# Patient Record
Sex: FEMALE | Race: WHITE | NOT HISPANIC OR LATINO | Employment: OTHER | URBAN - METROPOLITAN AREA
[De-identification: names, ages, dates, MRNs, and addresses within clinical notes are randomized per-mention and may not be internally consistent; named-entity substitution may affect disease eponyms.]

---

## 2017-01-05 ENCOUNTER — TRANSCRIBE ORDERS (OUTPATIENT)
Dept: ADMINISTRATIVE | Facility: HOSPITAL | Age: 22
End: 2017-01-05

## 2017-01-05 ENCOUNTER — ALLSCRIPTS OFFICE VISIT (OUTPATIENT)
Dept: OTHER | Facility: OTHER | Age: 22
End: 2017-01-05

## 2017-01-05 DIAGNOSIS — R00.0 TACHYCARDIA: ICD-10-CM

## 2017-01-05 DIAGNOSIS — R00.0 TACHYCARDIA, UNSPECIFIED: Primary | ICD-10-CM

## 2017-01-09 ENCOUNTER — HOSPITAL ENCOUNTER (OUTPATIENT)
Dept: NON INVASIVE DIAGNOSTICS | Facility: CLINIC | Age: 22
Discharge: HOME | End: 2017-01-09
Payer: COMMERCIAL

## 2017-01-09 DIAGNOSIS — R00.0 TACHYCARDIA, UNSPECIFIED: ICD-10-CM

## 2017-01-09 PROCEDURE — 93306 TTE W/DOPPLER COMPLETE: CPT

## 2017-01-10 ENCOUNTER — GENERIC CONVERSION - ENCOUNTER (OUTPATIENT)
Dept: OTHER | Facility: OTHER | Age: 22
End: 2017-01-10

## 2017-02-07 ENCOUNTER — ALLSCRIPTS OFFICE VISIT (OUTPATIENT)
Dept: OTHER | Facility: OTHER | Age: 22
End: 2017-02-07

## 2017-05-19 ENCOUNTER — GENERIC CONVERSION - ENCOUNTER (OUTPATIENT)
Dept: OTHER | Facility: OTHER | Age: 22
End: 2017-05-19

## 2017-06-01 ENCOUNTER — GENERIC CONVERSION - ENCOUNTER (OUTPATIENT)
Dept: OTHER | Facility: OTHER | Age: 22
End: 2017-06-01

## 2017-07-03 ENCOUNTER — ALLSCRIPTS OFFICE VISIT (OUTPATIENT)
Dept: OTHER | Facility: OTHER | Age: 22
End: 2017-07-03

## 2018-01-12 VITALS
HEIGHT: 63 IN | HEART RATE: 91 BPM | BODY MASS INDEX: 31.18 KG/M2 | WEIGHT: 176 LBS | SYSTOLIC BLOOD PRESSURE: 128 MMHG | DIASTOLIC BLOOD PRESSURE: 76 MMHG | OXYGEN SATURATION: 97 %

## 2018-01-14 VITALS
HEIGHT: 63 IN | HEART RATE: 74 BPM | DIASTOLIC BLOOD PRESSURE: 78 MMHG | OXYGEN SATURATION: 99 % | WEIGHT: 178 LBS | BODY MASS INDEX: 31.54 KG/M2 | SYSTOLIC BLOOD PRESSURE: 112 MMHG

## 2018-01-14 VITALS
BODY MASS INDEX: 31.01 KG/M2 | DIASTOLIC BLOOD PRESSURE: 84 MMHG | OXYGEN SATURATION: 98 % | WEIGHT: 175 LBS | HEIGHT: 63 IN | HEART RATE: 80 BPM | SYSTOLIC BLOOD PRESSURE: 132 MMHG

## 2018-01-15 ENCOUNTER — ALLSCRIPTS OFFICE VISIT (OUTPATIENT)
Dept: OTHER | Facility: OTHER | Age: 23
End: 2018-01-15

## 2018-01-15 NOTE — RESULT NOTES
Verified Results  ECHO COMPLETE WITH CONTRAST IF INDICATED [id number] 04:00PM Shelly Augustin     Test Name Result Flag Reference   ECHO COMPLETE WITH CONTRAST IF INDICATED (Report)     [facility], [address] [place] [address]   [phone]     Transthoracic Echocardiogram   2D, M-mode, Doppler, and Color Doppler     Study date: 2017     Patient: Kristin Boswell   MR number: [id number]   Account number: [de-identified]   : 1995   Age: 23 years   Gender: Female   Status: Outpatient   Location: [facility]   Height: 63 in   Weight: 176 lb   BP: 128/ 76 mmHg     Indications: Tachycardia  Diagnoses: R00 0 - Tachycardia, unspecified     Sonographer: Helm RCS   Primary Physician: Isaura Carrasco   Referring Physician: Jaleesa Guillaume MD   Group: [facility]   Interpreting Physician: Jaleesa Guillaume MD     SUMMARY     LEFT VENTRICLE:   Ejection fraction was estimated to be 60 %  Although no diagnostic regional wall motion abnormality was identified, this   possibility cannot be completely excluded on the basis of this study  SUMMARY MEASUREMENTS   Unspecified Scan Mode measurements:   Aortic Valve:  HR was 55 {H  B }/min  Left Ventricle:  HR was 57 {H  B }/min  HISTORY: PRIOR HISTORY: No Cardiac History   POTS syndrome     PROCEDURE: The study was performed in the [address] [place] [address]  This   was a routine study  The transthoracic approach was used  The study included   complete 2D imaging, M-mode, complete spectral Doppler, and color Doppler  Image quality was adequate  LEFT VENTRICLE: Size was normal  Ejection fraction was estimated to be 60 %  Although no diagnostic regional wall motion abnormality was identified, this   possibility cannot be completely excluded on the basis of this study   DOPPLER:   Left ventricular diastolic function parameters were normal      RIGHT VENTRICLE: The size was normal  Systolic function was normal  Wall   thickness was normal      LEFT ATRIUM: Size was normal      RIGHT ATRIUM: Size was normal      MITRAL VALVE: Valve structure was normal  There was normal leaflet separation  DOPPLER: The transmitral velocity was within the normal range  There was no   evidence for stenosis  There was no regurgitation  AORTIC VALVE: The valve was not well visualized  DOPPLER: There was no evidence   for stenosis  TRICUSPID VALVE: The valve structure was normal  There was normal leaflet   separation  DOPPLER: The transtricuspid velocity was within the normal range  There was no evidence for stenosis  There was no regurgitation  PULMONIC VALVE: Leaflets exhibited normal thickness, no calcification, and   normal cuspal separation  DOPPLER: The transpulmonic velocity was within the   normal range  There was no regurgitation  PERICARDIUM: There was no pericardial effusion  The pericardium was normal in   appearance  AORTA: The root exhibited normal size  SYSTEM MEASUREMENT TABLES     Apical four chamber   4 chamber Left Atrium Volume Index; Planimetry; End Systole; Apical four   chamber;: 12 21 cm2 Left Ventricular End Diastolic Volume; Method of Disks,   Single Plane; Apical four chamber;: 84 3 ml Left Ventricular End Systolic   Volume; Method of Disks, Single Plane; Apical four chamber;: 28 1 ml Right   Atrium Systolic Area; Planimetry; End Systole; Apical four chamber;: 12 55 cm2   Right Ventricular Internal Diastolic Dimension; End Diastole; Apical four   chamber;: 37 1 mm   TAPSE: 19 8 mm     Unspecified Scan Mode   Aortic Root Diameter; End Systole;: 22 5 mm   HR: 55 {H  B }/min   Left atrial diameter; End Diastole;: 32 6 mm   Cardiac Output; Teichholz; Systole;: 2 92 L/min   HR: 57 {H  B }/min   Heart rate; Teichholz;: 62 {H  B }/min   Interventricular Septum Diastolic Thickness;  Teichholz; End Diastole;: 6 9 mm   Left Ventricle Internal End Diastolic Dimension; Teichholz;: 41 9 mm   Left Ventricle Internal Systolic Dimension; Teichholz; End Systole;: 27 3 mm   Left Ventricle Mass; Mass AVCube with Teichholz; End Diastole;: 79 g   Left Ventricle Posterior Wall Diastolic Thickness; Teichholz; End Diastole;:   6 4 mm   Left Ventricular Ejection Fraction; Teichholz;: 64 4 %   Left Ventricular End Diastolic Volume; Teichholz;: 78 1 ml   Left Ventricular End Systolic Volume; Teichholz;: 27 8 ml   Left Ventricular Fractional Shortening;: 34 8 %   Stroke volume;  Teichholz; Systole;: 50 3 ml   Mitral Valve Area; Area by Pressure Half-Time; Systole;: 3 01 cm2   Mitral Valve E to A Ratio; Systole;: 3 5     [facility] Accredited Echocardiography Laboratory     Prepared and electronically signed by     David Griffin MD   Signed [id number] 36:54:07

## 2018-01-16 NOTE — PROGRESS NOTES
Assessment   Assessed    1  POTS (postural orthostatic tachycardia syndrome) (427 89) (R00 0,I95 1)   2  Postural dizziness with presyncope (780 4,780 2) (R42,R55)   3  Dizziness (780 4) (R42)    Plan   POTS (postural orthostatic tachycardia syndrome)    · Fludrocortisone Acetate 0 1 MG Oral Tablet; 1 tab bid   Rx By: Bessie Alvarenga; Dispense: 0 Days ; #:60 Tablet; Refill: 5;For: POTS (postural orthostatic tachycardia syndrome); KEVIN = N; Verified Transmission to [employer] 209/115; Last Updated By: [employer]; 1/15/2018 11:03:43 AM   · Metoprolol Tartrate 25 MG Oral Tablet; take 1 tablet by mouth twice a day   Rx By: Bessie Alvarenga; Dispense: 30 Days ; #:60 Tablet; Refill: 6;For: POTS (postural orthostatic tachycardia syndrome); KEVIN = N; Verified Transmission to [facility]-/115   · Follow-up visit in 3 months Evaluation and Treatment  Follow-up  Status: Hold For -    Scheduling  Requested for: [id number]   Ordered; For: POTS (postural orthostatic tachycardia syndrome); Ordered By: Bessie Alvarenga Performed:  Due: 27NSL3690; Last Updated By: Amanda Mills; 1/15/2018 11:03:29 AM   · Follow-up visit in 3 months Evaluation and Treatment  Follow-up  Status: Hold For -    Scheduling  Requested for: [id number]   Ordered; For: POTS (postural orthostatic tachycardia syndrome); Ordered By: Bessie Alvarenga Performed:  Due: [id number]; Last Updated By: Amanda Mills; 1/15/2018 11:27:09 AM    Discussion/Summary   Cardiology Discussion Summary Free Text Note Form [facility]:    Given symptoms will increase Fludocortisone to BID symptoms to stay well hydrated that she can add a little salt to her food with pcp all meds  Report any symptoms  All questions answered  Previous studies reviewed with patient, medications reviewed and possible side effects discussed  Continue risk factor modification  All questions answered  Safety measures reviewed  Patient advised to report any problems prompting medical attention  Return for follow up visit in 3 months or earlier if needed      Goals and Barriers: The patient has the current Goals: Stay hydrated  The patent has the current Barriers: None  Patient's Capacity to Self-Care: Patient is able to Self-Care  Patient Education: Educational resources provided: See [facility]     Medication SE Review and Pt Understands Tx: Possible side effects of new medications were reviewed with the patient/guardian today  The treatment plan was reviewed with the patient/guardian  The patient/guardian understands and agrees with the treatment plan    Counseling Documentation With Imm: The patient was counseled regarding diagnostic results,-- instructions for management,-- risk factor reductions,-- prognosis,-- patient and family education,-- risks and benefits of treatment options,-- importance of compliance with treatment  Chief Complaint   Chief Complaint Free Text Note Form: followup visit    Chief Complaint Chronic Condition [facility]: Patient is here today for follow up of chronic conditions described in HPI  History of Present Illness   Cardiology HPI Free Text Note Form [facility]: Patient presents for follow-up visit with history of POTS, postural dizziness with presyncope, dizziness  Patient states she has been having on a weekly basis dizziness with vomiting  She admits that she has not been very diligent with her fluid intake  She denies any chest pain, chest pressure, chest heaviness  She denies any shortness of breath, palpitations  She is taking all medications as prescribed and does not miss any doses  Review of Systems   Cardiology Female ROS:         Cardiac: experiencing fainting/blackouts, but-- as noted in HPI  Skin: No complaints of nonhealing sores or skin rash        Genitourinary: No complaints of recurrent urinary tract infections, frequent urination at night, difficult urination, blood in urine, kidney stones, loss of bladder control, kidney problems, denies any birth control or hormone replacement, is not post menopausal, not currently pregnant  Psychological: No complaints of feeling depressed, anxiety, panic attacks, or difficulty concentrating  General: No complaints of trouble sleeping, lack of energy, fatigue, appetite changes, weight changes, fever, frequent infections, or night sweats  Respiratory: No complaints of shortness of breath, cough with sputum, or wheezing  HEENT: No complaints of serious problems, hearing problems, nose problems, throat problems, or snoring  Gastrointestinal: vomiting, but-- as noted in HPI      Hematologic: No complaints of bleeding disorders, anemia, blood clots, or excessive brusing  Neurological: No complaints of numbness, tingling, dizziness, weakness, seizures, headaches, syncope or fainting, AM fatigue, daytime sleepiness, no witnessed apnea episodes  Musculoskeletal: No complaints of arthritis, back pain, or painfull swelling  ROS Reviewed:    ROS reviewed  Active Problems   Problems    1  Dizziness (780 4) (R42)   2  Postural dizziness with presyncope (780 4,780 2) (R42,R55)   3  POTS (postural orthostatic tachycardia syndrome) (427 89) (R00 0,I95 1)    Past Medical History   Problems    1  POTS (postural orthostatic tachycardia syndrome) (427 89) (R00 0,I95 1)  Active Problems And Past Medical History Reviewed: The active problems and past medical history were reviewed and updated today  Surgical History   Surgical History Reviewed: The surgical history was reviewed and updated today  Family History   Family History    1  No pertinent family history  Family History Reviewed: The family history was reviewed and updated today  Social History   Problems    · Nonsmoker (V41 89) (Z78 9)  Social History Reviewed: The social history was reviewed and updated today  The social history was reviewed and is unchanged  Current Meds    1   ARIPiprazole 5 MG Oral Tablet; Therapy: (Recorded:86Jwl1646) to Recorded   2  Fludrocortisone Acetate 0 1 MG Oral Tablet; 1 Tab daily; Therapy: 79UWI1598 to (Last Rx:[id number])  Requested for: 36XQW0807 Ordered   3  Metoprolol Tartrate 25 MG Oral Tablet; take 1 tablet by mouth twice a day; Therapy: 68HTU5913 to (Nanivandana Marleen)  Requested for: 40LWG3887; Last     TO:93GQC9997 Ordered   4  Sertraline HCl - 100 MG Oral Tablet; Take 1 tablet daily Recorded   5  TraZODone HCl - 50 MG Oral Tablet; TAKE 1 TABLET AT BEDTIME AS NEEDED; Therapy: 93THG7286 to Recorded  Medication List Reviewed: The medication list was reviewed and updated today  Allergies   Medication    1  No Known Drug Allergies    Vitals   Vital Signs    Recorded: 61ODS2279 10:55AM   Heart Rate 95   Systolic 392   Diastolic 70   Height 5 ft 3 in   Weight 187 lb 6 08 oz   BMI Calculated 33 19   BSA Calculated 1 89   O2 Saturation 99     Physical Exam        Constitutional      General appearance: No acute distress, well appearing and well nourished  Eyes      Conjunctiva and Sclera examination: Conjunctiva pink, sclera anicteric  Ears, Nose, Mouth, and Throat - Oropharynx: Clear, nares are clear, mucous membranes are moist       Neck      Neck and thyroid: Normal, supple, trachea midline, no thyromegaly  Pulmonary      Respiratory effort: No increased work of breathing or signs of respiratory distress  Auscultation of lungs: Clear to auscultation, no rales, no rhonchi, no wheezing, good air movement  Cardiovascular      Auscultation of heart: Normal rate and rhythm, normal S1 and S2, no murmurs  Carotid pulses: Normal, 2+ bilaterally  Peripheral vascular exam: Normal pulses throughout, no tenderness, erythema or swelling  Pedal pulses: Normal, 2+ bilaterally  Examination of extremities for edema and/or varicosities: Normal        Abdomen      Abdomen: Non-tender and no distention         Liver and spleen: No hepatomegaly or splenomegaly  Musculoskeletal Gait and station: Normal gait  -- Digits and nails: Normal without clubbing or cyanosis  -- Inspection/palpation of joints, bones, and muscles: Normal, ROM normal        Skin - Skin and subcutaneous tissue: Normal without rashes or lesions  Skin is warm and well perfused, normal turgor  Neurologic - Cranial nerves: II - XII intact  -- Speech: Normal        Psychiatric - Orientation to person, place, and time: Normal -- Mood and affect: Normal       Attending Note   Collaborating Physician Note: Collaborating Physician: I interviewed and examined the patient,-- I supervised the Advanced Practitioner-- and-- I agree with the Advanced Practitioner note        Signatures    Electronically signed by : DARCY Angela; Edilberto 15 2018 11:33AM EST                       (Author)     Electronically signed by : ZEE Fagan ; Edilberto 15 2018  2:34PM EST                       (Author)

## 2018-01-23 VITALS
SYSTOLIC BLOOD PRESSURE: 112 MMHG | HEIGHT: 63 IN | BODY MASS INDEX: 33.2 KG/M2 | HEART RATE: 95 BPM | OXYGEN SATURATION: 99 % | WEIGHT: 187.38 LBS | DIASTOLIC BLOOD PRESSURE: 70 MMHG

## 2018-04-09 ENCOUNTER — TELEPHONE (OUTPATIENT)
Dept: CARDIOLOGY CLINIC | Facility: CLINIC | Age: 23
End: 2018-04-09

## 2018-04-09 NOTE — TELEPHONE ENCOUNTER
PT PRIMARY TOLD HER THAT HER HR AT REST WAS 64  PT WANTS TO KNOW IF THAT'S NORMAL  PLEASE CALL PT TO LET HER KNOW   Bia Miller

## 2018-05-01 RX ORDER — ARIPIPRAZOLE 5 MG/1
5 TABLET ORAL DAILY
COMMUNITY
End: 2018-11-23 | Stop reason: ALTCHOICE

## 2018-05-01 RX ORDER — TRAZODONE HYDROCHLORIDE 50 MG/1
50 TABLET ORAL
COMMUNITY
End: 2018-11-23 | Stop reason: ALTCHOICE

## 2018-05-04 ENCOUNTER — OFFICE VISIT (OUTPATIENT)
Dept: CARDIOLOGY CLINIC | Facility: CLINIC | Age: 23
End: 2018-05-04
Payer: COMMERCIAL

## 2018-05-04 VITALS
HEART RATE: 72 BPM | BODY MASS INDEX: 37.07 KG/M2 | DIASTOLIC BLOOD PRESSURE: 82 MMHG | SYSTOLIC BLOOD PRESSURE: 114 MMHG | WEIGHT: 209.2 LBS | HEIGHT: 63 IN | OXYGEN SATURATION: 96 %

## 2018-05-04 DIAGNOSIS — I49.8 POTS (POSTURAL ORTHOSTATIC TACHYCARDIA SYNDROME): Primary | ICD-10-CM

## 2018-05-04 DIAGNOSIS — I95.1 ORTHOSTATIC HYPOTENSION: ICD-10-CM

## 2018-05-04 PROBLEM — G90.A POTS (POSTURAL ORTHOSTATIC TACHYCARDIA SYNDROME): Status: ACTIVE | Noted: 2018-05-04

## 2018-05-04 PROCEDURE — 99213 OFFICE O/P EST LOW 20 MIN: CPT | Performed by: INTERNAL MEDICINE

## 2018-05-04 RX ORDER — FLUDROCORTISONE ACETATE 0.1 MG/1
0.1 TABLET ORAL 2 TIMES DAILY
Qty: 180 TABLET | Refills: 3 | Status: SHIPPED | OUTPATIENT
Start: 2018-05-04 | End: 2018-10-03 | Stop reason: SDUPTHER

## 2018-05-04 RX ORDER — FLUDROCORTISONE ACETATE 0.1 MG/1
TABLET ORAL
Refills: 0 | COMMUNITY
Start: 2018-04-20 | End: 2018-05-04 | Stop reason: SDUPTHER

## 2018-05-04 NOTE — PROGRESS NOTES
[facility] [place] [facility]  [address]  [facility] [address]  Cardiology Follow Up    Danielle Mayo  1995  [id number]      1  POTS (postural orthostatic tachycardia syndrome)     2  Orthostatic hypotension         Chief Complaint   Patient presents with    Follow-up       Interval History:   Patient presents for follow-up visit  Patient denies any history of chest pain shortness of breath  Patient denies any history of orthopnea PND  No history of presyncope syncope  Patient states compliance with the present list of medications  Has occasional episodes of leg swelling  No history of palpitations  Patient Active Problem List   Diagnosis    Orthostatic hypotension     Past Medical History:   Diagnosis Date    POTS (postural orthostatic tachycardia syndrome)      Social History     Social History    Marital status: Single     Spouse name: N/A    Number of children: N/A    Years of education: N/A     Occupational History    Not on file  Social History Main Topics    Smoking status: Current Every Day Smoker    Smokeless tobacco: Never Used    Alcohol use No    Drug use: Yes     Types: Marijuana    Sexual activity: Not on file     Other Topics Concern    Not on file     Social History Narrative    No narrative on file      History reviewed  No pertinent family history  History reviewed  No pertinent surgical history      Current Outpatient Prescriptions:     FLUDROCORTISONE ACETATE PO, Take by mouth 2 (two) times a day  , Disp: , Rfl:     metoprolol tartrate (LOPRESSOR) 25 mg tablet, Take 25 mg by mouth every 12 (twelve) hours, Disp: , Rfl:     sertraline (ZOLOFT) 100 mg tablet, Take 100 mg by mouth daily Take 1 and 1/2 tabs daily , Disp: , Rfl:     ARIPiprazole (ABILIFY) 5 mg tablet, Take 5 mg by mouth daily, Disp: , Rfl:     traZODone (DESYREL) 50 mg tablet, Take 50 mg by mouth daily at bedtime, Disp: , Rfl:   No Known Allergies    Labs:  No visits with results within 2 Month(s) from this visit     Latest known visit with results is:   Admission on 12/04/2016, Discharged on 12/04/2016   Component Date Value    WBC 12/04/2016 9 50     RBC 12/04/2016 4 69     Hemoglobin 12/04/2016 14 3     Hematocrit 12/04/2016 43 1     MCV 12/04/2016 92     MCH 12/04/2016 30 5     MCHC 12/04/2016 33 2     RDW 12/04/2016 12 9     MPV 12/04/2016 11 2     Platelets 78/31/3998 275     nRBC 12/04/2016 0     Neutrophils Relative 12/04/2016 59     Lymphocytes Relative 12/04/2016 31     Monocytes Relative 12/04/2016 8     Eosinophils Relative 12/04/2016 2     Basophils Relative 12/04/2016 0     Neutrophils Absolute 12/04/2016 5 57     Lymphocytes Absolute 12/04/2016 2 93     Monocytes Absolute 12/04/2016 0 75     Eosinophils Absolute 12/04/2016 0 19     Basophils Absolute 12/04/2016 0 03     Sodium 12/04/2016 140     Potassium 12/04/2016 3 4*    Chloride 12/04/2016 102     CO2 12/04/2016 27     Anion Gap 12/04/2016 11     BUN 12/04/2016 14     Creatinine 12/04/2016 0 66     Glucose 12/04/2016 84     Calcium 12/04/2016 9 7     eGFR 12/04/2016 >60 0     Preg Test, Ur 12/04/2016 neg (-)     Color, UA 12/04/2016 yellow     Clarity, UA 12/04/2016 slightly cloudy     EXT Glucose, UA (Ref: Ne* 12/04/2016 neg (-)     EXT Bilirubin, UA (Ref: * 12/04/2016 neg (-)     EXT Ketones, UA (Ref: Ne* 12/04/2016 large (80)     EXT Spec Grav, UA 12/04/2016 1 015     EXT pH, UA 12/04/2016 6 5     EXT Protein, UA (Ref: Ne* 12/04/2016 trace     EXT Urobilinogen, UA (Re* 12/04/2016 0 2     EXT Nitrite, UA (Ref: Ne* 12/04/2016 neg (-)     EXT Leukocytes, UA (Ref:* 12/04/2016 neg (-)     EXT Blood, UA (Ref: Nega* 12/04/2016 neg (-)     Ventricular Rate 12/04/2016 78     Atrial Rate 12/04/2016 78     [place] Interval 12/04/2016 144     QRSD Interval 12/04/2016 78     QT Interval 12/04/2016 386     QTC Interval 12/04/2016 440     P Axis 12/04/2016 44     QRS Axis 12/04/2016 53  T Wave Axis 12/04/2016 41     POC Glucose 12/04/2016 71      Imaging: No results found  Review of Systems:  Review of Systems   REVIEW OF SYSTEMS:  Constitutional:  Denies fever or chills   Eyes:  Denies change in visual acuity   HENT:  Denies nasal congestion or sore throat   Respiratory:  Denies cough or shortness of breath   Cardiovascular:  Denies chest pain or edema   GI:  Denies abdominal pain, nausea, vomiting, bloody stools or diarrhea   :  Denies dysuria, frequency, difficulty in micturition and nocturia  Musculoskeletal:  Denies back pain or joint pain   Neurologic:  Denies headache, focal weakness or sensory changes   Endocrine:  Denies polyuria or polydipsia   Lymphatic:  Denies swollen glands   Psychiatric:anxiety     Physical Exam:    /82   Pulse 72   Ht 5' 3" (1 6 m)   Wt 94 9 kg (209 lb 3 2 oz)   SpO2 96%   BMI 37 06 kg/m²     Physical Exam   PHYSICAL EXAM:  General:  Patient is not in acute distress   Head: Normocephalic, Atraumatic  HEENT:  Both pupils normal-size atraumatic, normocephalic, nonicteric  Neck:  JVP not raised  Trachea central  No carotid bruit  Respiratory:  normal breath sounds no crackles  no rhonchi  Cardiovascular:  Regular rate and rhythm no S3 no murmurs  GI:  Abdomen soft nontender  No organomegaly  Lymphatic:  No cervical or inguinal lymphadenopathy  Neurologic:  Patient is awake alert, oriented   Grossly nonfocal    Discussion/Summary:  Patient overall doing well from a cardiovascular standpoint  Symptoms water from cardiac standpoint discussed  Importance of adequate hydration as well as liberalization of salt intake also discussed  Patient will try decreasing the dosage of Florinef to 0 1 mg 1 tablet a day instead of 2 tablets and see how she does  Symptoms to watch out discussed with patient and family  Patient and family had a few questions which were answered  Follow-up in 6 months or earlier as needed    Follow-up with primary care physician

## 2018-10-03 ENCOUNTER — TELEPHONE (OUTPATIENT)
Dept: CARDIOLOGY CLINIC | Facility: CLINIC | Age: 23
End: 2018-10-03

## 2018-10-03 DIAGNOSIS — I49.8 POTS (POSTURAL ORTHOSTATIC TACHYCARDIA SYNDROME): ICD-10-CM

## 2018-10-03 RX ORDER — FLUDROCORTISONE ACETATE 0.1 MG/1
0.1 TABLET ORAL 2 TIMES DAILY
Qty: 180 TABLET | Refills: 3 | Status: SHIPPED | OUTPATIENT
Start: 2018-10-03 | End: 2018-10-04 | Stop reason: SDUPTHER

## 2018-10-03 NOTE — TELEPHONE ENCOUNTER
To check with other pharmacies  I am not aware of the fact that this medication is not available at all  We also have other patients who take this medication

## 2018-10-03 NOTE — TELEPHONE ENCOUNTER
PT WENT TO THE PHARMACY AND THEY TOLD PT FLUDOCORTISONE IS DISCONTINUED  PLEASE CALL PT WITH WHAT MED WILL BE SENT IN   [place] [address]

## 2018-10-03 NOTE — TELEPHONE ENCOUNTER
She is supposed to be taking Florinef 0 1 milligram once a day instead of twice a day based on my last note

## 2018-10-03 NOTE — TELEPHONE ENCOUNTER
Edouard Shen called from [facility] stated that the same medication that was discontinued in the previous message was called in again   Edouard Shen would like a call back at [phone]

## 2018-10-03 NOTE — TELEPHONE ENCOUNTER
Summer Segovia called from [facility] stated that the same medication that was discontinued in the previous message was called in again  Summer Segovia would like a call back at [phone]  The medication is Fludrocortisone (Florinef) 0 1mg   Please advise

## 2018-10-04 DIAGNOSIS — I49.8 POTS (POSTURAL ORTHOSTATIC TACHYCARDIA SYNDROME): ICD-10-CM

## 2018-10-04 RX ORDER — FLUDROCORTISONE ACETATE 0.1 MG/1
TABLET ORAL
Qty: 90 TABLET | Refills: 3 | Status: SHIPPED | OUTPATIENT
Start: 2018-10-04 | End: 2018-11-23 | Stop reason: SDUPTHER

## 2018-10-04 NOTE — TELEPHONE ENCOUNTER
YUMIKO  S/w pt, she stated that [facility] in [place] has Florinef 0 1 mg      S/w Yuni Elliott from [facility], he stated as long as pt has refills with [facility] for medication, [facility] will be allowed to fill for pt  Called pt and no answer   LM stating Florinef 0 1 mg is to be taken once a day by Dr Fatou Redmond and medication will have to be picked up at [facility]

## 2018-10-04 NOTE — TELEPHONE ENCOUNTER
I just sent a prescription for Florinef 0 1 mg daily to [facility] in [place]     FYI

## 2018-11-23 ENCOUNTER — OFFICE VISIT (OUTPATIENT)
Dept: CARDIOLOGY CLINIC | Facility: CLINIC | Age: 23
End: 2018-11-23
Payer: COMMERCIAL

## 2018-11-23 VITALS
HEIGHT: 63 IN | OXYGEN SATURATION: 97 % | SYSTOLIC BLOOD PRESSURE: 114 MMHG | BODY MASS INDEX: 37.56 KG/M2 | HEART RATE: 56 BPM | DIASTOLIC BLOOD PRESSURE: 74 MMHG | WEIGHT: 212 LBS | RESPIRATION RATE: 18 BRPM

## 2018-11-23 DIAGNOSIS — I95.1 ORTHOSTATIC HYPOTENSION: Primary | ICD-10-CM

## 2018-11-23 DIAGNOSIS — I49.8 POTS (POSTURAL ORTHOSTATIC TACHYCARDIA SYNDROME): ICD-10-CM

## 2018-11-23 PROCEDURE — 99213 OFFICE O/P EST LOW 20 MIN: CPT | Performed by: INTERNAL MEDICINE

## 2018-11-23 RX ORDER — FLUDROCORTISONE ACETATE 0.1 MG/1
TABLET ORAL
Qty: 90 TABLET | Refills: 3 | Status: SHIPPED | OUTPATIENT
Start: 2018-11-23 | End: 2019-03-02

## 2018-11-23 NOTE — PROGRESS NOTES
[facility]  [facility] [address]  [facility] [address]  Cardiology Follow Up    Navjot Marshall  1995  [id number]      1  Orthostatic hypotension     2  POTS (postural orthostatic tachycardia syndrome)         Chief Complaint   Patient presents with    Follow-up     Pt states she is feeling really fatigue and states weight has been getting gained  Interval History:  Patient presents for follow-up visit  Patient has history of orthostatic hypotension  Patient also has history of POTS  No history of dizziness or presyncope syncope  No history of palpitations  Patient does have feeling of fatigue and tiredness  Questions whether she can decrease the dosage of metoprolol  Patient Active Problem List   Diagnosis    POTS (postural orthostatic tachycardia syndrome)     Past Medical History:   Diagnosis Date    POTS (postural orthostatic tachycardia syndrome)      Social History     Social History    Marital status: Single     Spouse name: N/A    Number of children: N/A    Years of education: N/A     Occupational History    Not on file  Social History Main Topics    Smoking status: Current Every Day Smoker    Smokeless tobacco: Never Used    Alcohol use No    Drug use: Yes     Types: Marijuana    Sexual activity: Not on file     Other Topics Concern    Not on file     Social History Narrative    No narrative on file      History reviewed  No pertinent family history  History reviewed  No pertinent surgical history      Current Outpatient Prescriptions:     fludrocortisone (FLORINEF) 0 1 mg tablet, 1 tab daily, Disp: 90 tablet, Rfl: 3    metoprolol tartrate (LOPRESSOR) 25 mg tablet, Take 1 tablet (25 mg total) by mouth every 12 (twelve) hours, Disp: 180 tablet, Rfl: 3    sertraline (ZOLOFT) 100 mg tablet, Take 100 mg by mouth daily Take 1 and 1/2 tabs daily , Disp: , Rfl:     ARIPiprazole (ABILIFY) 5 mg tablet, Take 5 mg by mouth daily, Disp: , Rfl:   No Known Allergies    Labs:  No visits with results within 2 Month(s) from this visit     Latest known visit with results is:   Admission on 12/04/2016, Discharged on 12/04/2016   Component Date Value    WBC 12/04/2016 9 50     RBC 12/04/2016 4 69     Hemoglobin 12/04/2016 14 3     Hematocrit 12/04/2016 43 1     MCV 12/04/2016 92     MCH 12/04/2016 30 5     MCHC 12/04/2016 33 2     RDW 12/04/2016 12 9     MPV 12/04/2016 11 2     Platelets 29/42/3329 275     nRBC 12/04/2016 0     Neutrophils Relative 12/04/2016 59     Lymphocytes Relative 12/04/2016 31     Monocytes Relative 12/04/2016 8     Eosinophils Relative 12/04/2016 2     Basophils Relative 12/04/2016 0     Neutrophils Absolute 12/04/2016 5 57     Lymphocytes Absolute 12/04/2016 2 93     Monocytes Absolute 12/04/2016 0 75     Eosinophils Absolute 12/04/2016 0 19     Basophils Absolute 12/04/2016 0 03     Sodium 12/04/2016 140     Potassium 12/04/2016 3 4*    Chloride 12/04/2016 102     CO2 12/04/2016 27     ANION GAP 12/04/2016 11     BUN 12/04/2016 14     Creatinine 12/04/2016 0 66     Glucose 12/04/2016 84     Calcium 12/04/2016 9 7     eGFR 12/04/2016 >60 0     Preg Test, Ur (Ref: Nega* 12/04/2016 neg (-)     Color, UA 12/04/2016 yellow     Clarity, UA 12/04/2016 slightly cloudy     Glucose, UA (Ref: Negati* 12/04/2016 neg (-)     Bilirubin, UA (Ref: Nega* 12/04/2016 neg (-)     Ketones, UA (Ref: Negati* 12/04/2016 large (80)     Spec Grav, UA (Ref:1 003* 12/04/2016 1 015     pH, UA (Ref: 4 5-8 0) 12/04/2016 6 5     Protein, UA (Ref: Negati* 12/04/2016 trace     Urobilinogen, UA (Ref: 0* 12/04/2016 0 2     Nitrite, UA (Ref: Negati* 12/04/2016 neg (-)      Leukocytes, UA (Ref: Ne* 12/04/2016 neg (-)     Blood, UA (Ref: Negative) 12/04/2016 neg (-)     Ventricular Rate 12/04/2016 78     Atrial Rate 12/04/2016 78     SC Interval 12/04/2016 144     QRSD Interval 12/04/2016 78     QT Interval 12/04/2016 386     QTC Interval 12/04/2016 440     P Axis 12/04/2016 44     QRS Axis 12/04/2016 53     T Wave Axis 12/04/2016 41     POC Glucose 12/04/2016 71      Imaging: No results found  Review of Systems:  Review of Systems   REVIEW OF SYSTEMS:  Constitutional:  Denies fever or chills   Fatigue  Eyes:  Denies change in visual acuity   HENT:  Denies nasal congestion or sore throat   Respiratory:  Denies cough or shortness of breath   Cardiovascular:  Denies chest pain or edema   GI:  Denies abdominal pain, nausea, vomiting, bloody stools or diarrhea   :  Denies dysuria, frequency, difficulty in micturition and nocturia  Musculoskeletal:  Denies back pain or joint pain   Neurologic:  Denies headache, focal weakness or sensory changes   Endocrine:  Denies polyuria or polydipsia   Lymphatic:  Denies swollen glands   Psychiatric:  Denies depression or anxiety     Physical Exam:    /74   Pulse 56   Resp 18   Ht 5' 3" (1 6 m)   Wt 96 2 kg (212 lb)   SpO2 97%   BMI 37 55 kg/m²     Physical Exam   PHYSICAL EXAM:  General:  Patient is not in acute distress   Head: Normocephalic, Atraumatic  HEENT:  Both pupils normal-size atraumatic, normocephalic, nonicteric  Neck:  JVP not raised  Trachea central  No carotid bruit  Respiratory:  normal breath sounds no crackles  no rhonchi  Cardiovascular:  Regular rate and rhythm no S3 no murmurs  GI:  Abdomen soft nontender  No organomegaly  Lymphatic:  No cervical or inguinal lymphadenopathy  Neurologic:  Patient is awake alert, oriented   Grossly nonfocal    Discussion/Summary:  Patient overall doing well from a cardiovascular standpoint  Continue present medications except she will try to decrease the dosage of metoprolol to 25 mg half tablet twice a day and see how she feels  She is patient report any symptoms  Patient is strongly counseled to lose weight to reduce cardiovascular morbidity and mortality  Patient is planning to consult weight Management [facility]    Importance of adequate hydration reinforced with the patient  Family had a lot of questions which were answered  Follow-up in 6 months or earlier as needed  Follow-up with primary care physician  Patient is agreeable with the plan of care

## 2019-01-11 ENCOUNTER — TRANSCRIBE ORDERS (OUTPATIENT)
Dept: ADMINISTRATIVE | Facility: HOSPITAL | Age: 24
End: 2019-01-11

## 2019-01-11 ENCOUNTER — APPOINTMENT (OUTPATIENT)
Dept: LAB | Facility: CLINIC | Age: 24
End: 2019-01-11
Payer: COMMERCIAL

## 2019-01-11 DIAGNOSIS — J91.8 PLEURAL EFFUSION ASSOCIATED WITH HEPATIC DISORDER: ICD-10-CM

## 2019-01-11 DIAGNOSIS — K76.9 PLEURAL EFFUSION ASSOCIATED WITH HEPATIC DISORDER: ICD-10-CM

## 2019-01-11 DIAGNOSIS — D64.9 ANEMIA, UNSPECIFIED TYPE: ICD-10-CM

## 2019-01-11 DIAGNOSIS — D51.9 ANEMIA DUE TO VITAMIN B12 DEFICIENCY, UNSPECIFIED B12 DEFICIENCY TYPE: ICD-10-CM

## 2019-01-11 DIAGNOSIS — R53.83 OTHER FATIGUE: ICD-10-CM

## 2019-01-11 DIAGNOSIS — E78.5 HYPERLIPIDEMIA, UNSPECIFIED HYPERLIPIDEMIA TYPE: ICD-10-CM

## 2019-01-11 DIAGNOSIS — J91.8 PLEURAL EFFUSION ASSOCIATED WITH HEPATIC DISORDER: Primary | ICD-10-CM

## 2019-01-11 DIAGNOSIS — E55.9 VITAMIN D DEFICIENCY: ICD-10-CM

## 2019-01-11 DIAGNOSIS — K76.9 PLEURAL EFFUSION ASSOCIATED WITH HEPATIC DISORDER: Primary | ICD-10-CM

## 2019-01-11 DIAGNOSIS — E03.9 ACQUIRED HYPOTHYROIDISM: ICD-10-CM

## 2019-01-11 LAB
25(OH)D3 SERPL-MCNC: 60.1 NG/ML (ref 30–100)
ALBUMIN SERPL BCP-MCNC: 3.8 G/DL (ref 3.5–5)
ALP SERPL-CCNC: 94 U/L (ref 46–116)
ALT SERPL W P-5'-P-CCNC: 19 U/L (ref 12–78)
ANION GAP SERPL CALCULATED.3IONS-SCNC: 7 MMOL/L (ref 4–13)
AST SERPL W P-5'-P-CCNC: 13 U/L (ref 5–45)
BASOPHILS # BLD AUTO: 0.03 THOUSANDS/ΜL (ref 0–0.1)
BASOPHILS NFR BLD AUTO: 0 % (ref 0–1)
BILIRUB DIRECT SERPL-MCNC: 0.06 MG/DL (ref 0–0.2)
BILIRUB SERPL-MCNC: 0.3 MG/DL (ref 0.2–1)
BUN SERPL-MCNC: 4 MG/DL (ref 5–25)
CALCIUM SERPL-MCNC: 9.1 MG/DL (ref 8.3–10.1)
CHLORIDE SERPL-SCNC: 106 MMOL/L (ref 100–108)
CHOLEST SERPL-MCNC: 189 MG/DL (ref 50–200)
CO2 SERPL-SCNC: 25 MMOL/L (ref 21–32)
CREAT SERPL-MCNC: 0.62 MG/DL (ref 0.6–1.3)
EOSINOPHIL # BLD AUTO: 0.12 THOUSAND/ΜL (ref 0–0.61)
EOSINOPHIL NFR BLD AUTO: 2 % (ref 0–6)
ERYTHROCYTE [DISTWIDTH] IN BLOOD BY AUTOMATED COUNT: 13.1 % (ref 11.6–15.1)
FERRITIN SERPL-MCNC: 95 NG/ML (ref 8–388)
GFR SERPL CREATININE-BSD FRML MDRD: 128 ML/MIN/1.73SQ M
GLUCOSE P FAST SERPL-MCNC: 98 MG/DL (ref 65–99)
HCT VFR BLD AUTO: 45.8 % (ref 34.8–46.1)
HDLC SERPL-MCNC: 55 MG/DL (ref 40–60)
HGB BLD-MCNC: 14.8 G/DL (ref 11.5–15.4)
IMM GRANULOCYTES # BLD AUTO: 0.02 THOUSAND/UL (ref 0–0.2)
IMM GRANULOCYTES NFR BLD AUTO: 0 % (ref 0–2)
IRON SATN MFR SERPL: 20 %
IRON SERPL-MCNC: 67 UG/DL (ref 50–170)
LDLC SERPL CALC-MCNC: 107 MG/DL (ref 0–100)
LYMPHOCYTES # BLD AUTO: 1.37 THOUSANDS/ΜL (ref 0.6–4.47)
LYMPHOCYTES NFR BLD AUTO: 19 % (ref 14–44)
MCH RBC QN AUTO: 31.1 PG (ref 26.8–34.3)
MCHC RBC AUTO-ENTMCNC: 32.3 G/DL (ref 31.4–37.4)
MCV RBC AUTO: 96 FL (ref 82–98)
MONOCYTES # BLD AUTO: 0.43 THOUSAND/ΜL (ref 0.17–1.22)
MONOCYTES NFR BLD AUTO: 6 % (ref 4–12)
NEUTROPHILS # BLD AUTO: 5.15 THOUSANDS/ΜL (ref 1.85–7.62)
NEUTS SEG NFR BLD AUTO: 73 % (ref 43–75)
NONHDLC SERPL-MCNC: 134 MG/DL
NRBC BLD AUTO-RTO: 0 /100 WBCS
PLATELET # BLD AUTO: 252 THOUSANDS/UL (ref 149–390)
PMV BLD AUTO: 12 FL (ref 8.9–12.7)
POTASSIUM SERPL-SCNC: 4 MMOL/L (ref 3.5–5.3)
PROT SERPL-MCNC: 7.3 G/DL (ref 6.4–8.2)
RBC # BLD AUTO: 4.76 MILLION/UL (ref 3.81–5.12)
SODIUM SERPL-SCNC: 138 MMOL/L (ref 136–145)
T3FREE SERPL-MCNC: 2.83 PG/ML (ref 2.3–4.2)
TIBC SERPL-MCNC: 331 UG/DL (ref 250–450)
TRIGL SERPL-MCNC: 133 MG/DL
TSH SERPL DL<=0.05 MIU/L-ACNC: 1.89 UIU/ML (ref 0.36–3.74)
VIT B12 SERPL-MCNC: 1283 PG/ML (ref 100–900)
WBC # BLD AUTO: 7.12 THOUSAND/UL (ref 4.31–10.16)

## 2019-01-11 PROCEDURE — 84443 ASSAY THYROID STIM HORMONE: CPT

## 2019-01-11 PROCEDURE — 83550 IRON BINDING TEST: CPT

## 2019-01-11 PROCEDURE — 82607 VITAMIN B-12: CPT

## 2019-01-11 PROCEDURE — 83540 ASSAY OF IRON: CPT

## 2019-01-11 PROCEDURE — 84481 FREE ASSAY (FT-3): CPT

## 2019-01-11 PROCEDURE — 80076 HEPATIC FUNCTION PANEL: CPT

## 2019-01-11 PROCEDURE — 85025 COMPLETE CBC W/AUTO DIFF WBC: CPT

## 2019-01-11 PROCEDURE — 86800 THYROGLOBULIN ANTIBODY: CPT

## 2019-01-11 PROCEDURE — 36415 COLL VENOUS BLD VENIPUNCTURE: CPT

## 2019-01-11 PROCEDURE — 82306 VITAMIN D 25 HYDROXY: CPT

## 2019-01-11 PROCEDURE — 80061 LIPID PANEL: CPT

## 2019-01-11 PROCEDURE — 82728 ASSAY OF FERRITIN: CPT

## 2019-01-11 PROCEDURE — 80048 BASIC METABOLIC PNL TOTAL CA: CPT

## 2019-01-11 PROCEDURE — 86376 MICROSOMAL ANTIBODY EACH: CPT

## 2019-01-13 LAB — THYROPEROXIDASE AB SERPL-ACNC: 18 IU/ML (ref 0–34)

## 2019-01-15 LAB — THYROGLOB AB SERPL-ACNC: <1 IU/ML (ref 0–0.9)

## 2019-02-21 ENCOUNTER — OFFICE VISIT (OUTPATIENT)
Dept: CARDIOLOGY CLINIC | Facility: CLINIC | Age: 24
End: 2019-02-21
Payer: COMMERCIAL

## 2019-02-21 VITALS
SYSTOLIC BLOOD PRESSURE: 90 MMHG | WEIGHT: 208.8 LBS | BODY MASS INDEX: 37 KG/M2 | DIASTOLIC BLOOD PRESSURE: 64 MMHG | HEIGHT: 63 IN | HEART RATE: 60 BPM

## 2019-02-21 DIAGNOSIS — G90.9 AUTONOMIC DYSFUNCTION: Primary | ICD-10-CM

## 2019-02-21 DIAGNOSIS — I49.8 POTS (POSTURAL ORTHOSTATIC TACHYCARDIA SYNDROME): ICD-10-CM

## 2019-02-21 PROCEDURE — 99244 OFF/OP CNSLTJ NEW/EST MOD 40: CPT | Performed by: INTERNAL MEDICINE

## 2019-02-21 PROCEDURE — 93000 ELECTROCARDIOGRAM COMPLETE: CPT | Performed by: INTERNAL MEDICINE

## 2019-02-21 NOTE — LETTER
February 24, 2019     Sethvandana Antoine, [address]    Patient: Pura Cadena   YOB: 1995   Date of Visit: 2/21/2019       Dear Dr Reita Galeazzi: Thank you for referring Marina Stark to me for evaluation  Below are my notes for this consultation  If you have questions, please do not hesitate to call me  I look forward to following your patient along with you  Sincerely,        Alyssa Sue MD        CC: Charlie Last MD  2/24/2019  1:50 PM  Sign at close encounter                                             Cardiology Consultation     Pura Cadena  [id number]  1995  [facility] [place] [id number] [facility] [address]    1  Autonomic dysfunction  Compression Stocking    Echo complete with contrast if indicated    Stress test only, exercise    Tilt table    Holter monitor - 48 hour   2   POTS (postural orthostatic tachycardia syndrome)  POCT ECG       History of present illness    The patient has been referred to me by Dr Melba Wilkinson for evaluation of autonomic dysfunction and POTS     The patient carries this diagnosis for over 5 years  However there is no tilt study, holter when the diagnosis was originally made    She does give a history of lightheadedness and occasional headache  There is also history of short-term memory loss after concussion  She does not have a history of chest pain  There is occasional palpitation  There is no history of early satiety or bloating    There has been a gradual weight gain  This has been worsened when she was on  fludrocortisone according to her    She works as a   She is getting tired more often when she needs to stand for long periods of time    Most importantly there are episodes of palpitation which would come suddenly  This can happen when she is sleeping at night  This can also happen when she is sitting and watching movies  According to her and her mother suddenly are heart rate will be in the 140s      The patient is not complaining of anginal like chest pain or chest pressure  There is no worsening orthopnea, paroxysmal nocturnal dyspnea  There is no leg swelling    Patient does get palpitation   There is no history of presyncope or syncope  There is rare history of transient  lightheadedness  When patient has these palpitations, it is associated with exertional intolerance      There is history of snoring at night  There is history of morning fatigability  There is occasional history of daytime sleepiness        Patient Active Problem List   Diagnosis    POTS (postural orthostatic tachycardia syndrome)     Past Medical History:   Diagnosis Date    POTS (postural orthostatic tachycardia syndrome)      Social History     Socioeconomic History    Marital status: Single     Spouse name: Not on file    Number of children: Not on file    Years of education: Not on file    Highest education level: Not on file   Occupational History    Not on file   Social Needs    Financial resource strain: Not on file    Food insecurity:     Worry: Not on file     Inability: Not on file    Transportation needs:     Medical: Not on file     Non-medical: Not on file   Tobacco Use    Smoking status: Current Every Day Smoker    Smokeless tobacco: Never Used   Substance and Sexual Activity    Alcohol use: No    Drug use: Yes     Types: Marijuana    Sexual activity: Not on file   Lifestyle    Physical activity:     Days per week: Not on file     Minutes per session: Not on file    Stress: Not on file   Relationships    Social connections:     Talks on phone: Not on file     Gets together: Not on file     Attends [employer] service: Not on file     Active member of club or organization: Not on file     Attends meetings of clubs or organizations: Not on file     Relationship status: Not on file    Intimate partner violence:     Fear of current or ex partner: Not on file     Emotionally abused: Not on file     Physically abused: Not on file     Forced sexual activity: Not on file   Other Topics Concern    Not on file   Social History Narrative    Not on file      No family history on file  No past surgical history on file  Current Outpatient Medications:     fludrocortisone (FLORINEF) 0 1 mg tablet, 1 tab daily, Disp: 90 tablet, Rfl: 3    sertraline (ZOLOFT) 100 mg tablet, Take 100 mg by mouth daily Take 1 and 1/2 tabs daily , Disp: , Rfl:     propranolol (INDERAL) 10 mg tablet, Take 1 tablet (10 mg total) by mouth 3 (three) times a day, Disp: 90 tablet, Rfl: 3  No Known Allergies  Vitals:    02/21/19 1703   BP: 90/64   Pulse: 60   Weight: 94 7 kg (208 lb 12 8 oz)   Height: 5' 3" (1 6 m)           Labs:  Normal kidney function  Normal liver function  Need to check hemoglobin, hematocrit , TSH    Imaging: No results found  Review of Systems:  Review of Systems   All other systems reviewed and are negative  As described in my history of present illness    Physical Exam:  Physical Exam   Constitutional: She is oriented to person, place, and time  She appears well-developed and well-nourished  No distress  Not in any distress at the current time   HENT:   Head: Normocephalic and atraumatic  Right Ear: External ear normal    Left Ear: External ear normal    Nose: Nose normal    Mouth/Throat: Uvula is midline and mucous membranes are normal    Posterior pharynx is crowded   Eyes: Pupils are equal, round, and reactive to light  Conjunctivae, EOM and lids are normal  No scleral icterus  No pallor  No cyanosis  No icterus   Neck: Trachea normal and normal range of motion  No JVD present  Carotid bruit is not present  No thyromegaly present  No jugular lymphadenopathy  Short thick neck   Cardiovascular: Normal rate, regular rhythm, S1 normal, S2 normal, normal heart sounds, intact distal pulses and normal pulses  PMI is not displaced   Exam reveals no gallop, no S3, no S4 and no friction rub  No murmur heard  Pulmonary/Chest: Effort normal and breath sounds normal  No accessory muscle usage  No respiratory distress  She has no decreased breath sounds  She has no wheezes  She has no rhonchi  She has no rales  She exhibits no tenderness  Abdominal: Soft  Normal appearance and bowel sounds are normal  She exhibits no distension and no mass  There is no splenomegaly or hepatomegaly  There is no tenderness  Musculoskeletal: Normal range of motion  She exhibits no edema, tenderness or deformity  Lymphadenopathy:     She has no cervical adenopathy  Neurological: She is alert and oriented to person, place, and time  Facial symmetry is retained  Extraocular movements are retained  Head neck tongue and palate movement are retained and symmetric   Skin: Skin is intact  No abrasion, no lesion and no rash noted  No erythema  Nails show no clubbing  Psychiatric: She has a normal mood and affect  Her speech is normal and behavior is normal  Thought content normal        Discussion/Summary:    1   History of palpitation, autonomic dysfunction  Patient also carries a diagnosis of POTS  I do not have access to any tilt-table study, Holter which led to the current diagnosis  At the current time we will treat her symptoms and also try to establish a diagnosis      Diagnostic procedures:  Echocardiogram  Holter 48 hours - to look at variations in heart rate and the circadian rhythm  Tilt-table study- can wait till the patient is off beta-blockers          Therapeutic interventions  - keep water intake between 2 5-3 L    - can use half as Gatorade or Powerade  At the current time will avoid using extra salt    -use of beta-blockers   patient is currently not tachycardic  However will still recommend to discontinue metoprolol  Start propranolol 10 mg 3 times daily  If symptomatic really doing well can reduce it to 5 mg 3 times a day by a gradual tapering method       - Pressure stockings  Bilateral  Knee high, thigh high or waist high  10 mm, 20 mm, 30 mm, 40 mm Hg  Helps with venous return        - Exercise -  [place] protocol  Set up appointment with Ms Ninoska Mauricio / Ms Liset Veliz  This has both aerobic and muscle training component  Aerobic- will increase vagal tone and control of heart rate  Muscle training of legs- will help with venous return   Will request monthly evaluation of level of exercise tolerance       - Medications to avoid  Numerous anti depressants and ADHD medications that inhibit NE transporter  TCA  SNRI - duloxetine, venlafaxine, milnacipram  NRI - atomoxetine, reboxetine  These can provoke POTS in susceptible healthy volunteers            2  Palpitation suggestive of SVT  This is the patient's 2nd type of palpitation  This can happen even when she is sitting or laying in bed  This is sudden in onset    This has never been evaluated  Would recommend a 30 day event monitor - this has been frequent enough especially when beta-blockers are being reduced  In addition can look at an exercise stress test to see if any arrhythmias induced  The mother is this question about WPW syndrome-The EKG does not show any evidence of preexcitation          3   Obesity  Patient's BMI is over 35  She will definitely benefit from organized exercise regimen          Summary of my recommendations at the current time:  Echocardiogram  Holter for 48 hours  Event monitor for 30 days if nothing obvious found on Holter  Exercise stress test with EP supervision to look for pre-excitation  Water intake as advised  Compression stockings advised  Consultation for the [facility] protocol  Upon medications that are in NRI or SNRI group       Very detailed discussion done with the patient and her mother  A total of 90 minutes was spent in the case of which 70% of the time was spent in discussing the current management protocol  They understand that we are trying to reestablish with the diagnosis at the same time as we are treating her symptoms

## 2019-02-21 NOTE — LETTER
February 24, 2019     Lisa Tirado, [address]     Patient: Jose Olivares   YOB: 1995   Date of Visit: 2/21/2019       Dear Dr Alfie Carcamo: Thank you for referring aDrrius Bell to me for evaluation  Below are my notes for this consultation  If you have questions, please do not hesitate to call me  I look forward to following your patient along with you  Sincerely,        Lev Garcia MD        CC: Booker Ballesteros MD  2/24/2019  1:48 PM  Sign at close encounter                                             Cardiology Consultation     Jose Olivares  [id number]  1995  [address]    1  Autonomic dysfunction  Compression Stocking    Echo complete with contrast if indicated    Stress test only, exercise    Tilt table    Holter monitor - 48 hour   2   POTS (postural orthostatic tachycardia syndrome)  POCT ECG       History of present illness    The patient has been referred to me by Dr Chay Goncalves for evaluation of autonomic dysfunction and POTS     The patient carries this diagnosis for over 5 years  However there is no tilt study, holter when the diagnosis was originally made    She does give a history of lightheadedness and occasional headache  There is also history of short-term memory loss after concussion  She does not have a history of chest pain  There is occasional palpitation  There is no history of early satiety or bloating    There has been a gradual weight gain  This has been worsened when she was on  fludrocortisone according to her    She works as a   She is getting tired more often when she needs to stand for long periods of time    Most importantly there are episodes of palpitation which would come suddenly  This can happen when she is sleeping at night  This can also happen when she is sitting and watching movies  According to her and her mother suddenly are heart rate will be in the 140s      The patient is not complaining of anginal like chest pain or chest pressure  There is no worsening orthopnea, paroxysmal nocturnal dyspnea  There is no leg swelling    Patient does get palpitation   There is no history of presyncope or syncope  There is rare history of transient  lightheadedness  When patient has these palpitations, it is associated with exertional intolerance      There is history of snoring at night  There is history of morning fatigability  There is occasional history of daytime sleepiness        Patient Active Problem List   Diagnosis    POTS (postural orthostatic tachycardia syndrome)     Past Medical History:   Diagnosis Date    POTS (postural orthostatic tachycardia syndrome)      Social History     Socioeconomic History    Marital status: Single     Spouse name: Not on file    Number of children: Not on file    Years of education: Not on file    Highest education level: Not on file   Occupational History    Not on file   Social Needs    Financial resource strain: Not on file    Food insecurity:     Worry: Not on file     Inability: Not on file    Transportation needs:     Medical: Not on file     Non-medical: Not on file   Tobacco Use    Smoking status: Current Every Day Smoker    Smokeless tobacco: Never Used   Substance and Sexual Activity    Alcohol use: No    Drug use: Yes     Types: Marijuana    Sexual activity: Not on file   Lifestyle    Physical activity:     Days per week: Not on file     Minutes per session: Not on file    Stress: Not on file   Relationships    Social connections:     Talks on phone: Not on file     Gets together: Not on file     Attends Spiritism service: Not on file     Active member of club or organization: Not on file     Attends meetings of clubs or organizations: Not on file     Relationship status: Not on file    Intimate partner violence:     Fear of current or ex partner: Not on file     Emotionally abused: Not on file     Physically abused: Not on file     Forced sexual activity: Not on file   Other Topics Concern    Not on file   Social History Narrative    Not on file      No family history on file  No past surgical history on file  Current Outpatient Medications:     fludrocortisone (FLORINEF) 0 1 mg tablet, 1 tab daily, Disp: 90 tablet, Rfl: 3    sertraline (ZOLOFT) 100 mg tablet, Take 100 mg by mouth daily Take 1 and 1/2 tabs daily , Disp: , Rfl:     propranolol (INDERAL) 10 mg tablet, Take 1 tablet (10 mg total) by mouth 3 (three) times a day, Disp: 90 tablet, Rfl: 3  No Known Allergies  Vitals:    02/21/19 1703   BP: 90/64   Pulse: 60   Weight: 94 7 kg (208 lb 12 8 oz)   Height: 5' 3" (1 6 m)       Labs:{Recent [id number]:01837::"EZV applicable"}  Imaging: No results found  Review of Systems:  Review of Systems   All other systems reviewed and are negative  As described in my history of present illness    Physical Exam:  Physical Exam   Constitutional: She is oriented to person, place, and time  She appears well-developed and well-nourished  No distress  Not in any distress at the current time   HENT:   Head: Normocephalic and atraumatic  Right Ear: External ear normal    Left Ear: External ear normal    Nose: Nose normal    Mouth/Throat: Uvula is midline and mucous membranes are normal    Posterior pharynx is crowded   Eyes: Pupils are equal, round, and reactive to light  Conjunctivae, EOM and lids are normal  No scleral icterus  No pallor  No cyanosis  No icterus   Neck: Trachea normal and normal range of motion  No JVD present  Carotid bruit is not present  No thyromegaly present  No jugular lymphadenopathy  Short thick neck   Cardiovascular: Normal rate, regular rhythm, S1 normal, S2 normal, normal heart sounds, intact distal pulses and normal pulses  PMI is not displaced  Exam reveals no gallop, no S3, no S4 and no friction rub     No murmur heard   Pulmonary/Chest: Effort normal and breath sounds normal  No accessory muscle usage  No respiratory distress  She has no decreased breath sounds  She has no wheezes  She has no rhonchi  She has no rales  She exhibits no tenderness  Abdominal: Soft  Normal appearance and bowel sounds are normal  She exhibits no distension and no mass  There is no splenomegaly or hepatomegaly  There is no tenderness  Musculoskeletal: Normal range of motion  She exhibits no edema, tenderness or deformity  Lymphadenopathy:     She has no cervical adenopathy  Neurological: She is alert and oriented to person, place, and time  Facial symmetry is retained  Extraocular movements are retained  Head neck tongue and palate movement are retained and symmetric   Skin: Skin is intact  No abrasion, no lesion and no rash noted  No erythema  Nails show no clubbing  Psychiatric: She has a normal mood and affect  Her speech is normal and behavior is normal  Thought content normal        Discussion/Summary:    1   History of palpitation, autonomic dysfunction  Patient also carries a diagnosis of POTS  I do not have access to any tilt-table study, Holter which led to the current diagnosis  At the current time we will treat her symptoms and also try to establish a diagnosis      Diagnostic procedures:  Echocardiogram  Holter 48 hours - to look at variations in heart rate and the circadian rhythm  Tilt-table study- can wait till the patient is off beta-blockers          Therapeutic interventions  - keep water intake between 2 5-3 L    - can use half as Gatorade or Powerade  At the current time will avoid using extra salt    -use of beta-blockers   patient is currently not tachycardic  However will still recommend to discontinue metoprolol  Start propranolol 10 mg 3 times daily  If symptomatic really doing well can reduce it to 5 mg 3 times a day by a gradual tapering method       - Pressure stockings  Bilateral  Knee high, thigh high or waist high  10 mm, 20 mm, 30 mm, 40 mm Hg  Helps with venous return        - Exercise -  Earle protocol  Set up appointment with Ms Eitan Swartz / Ms Lori Douglass  This has both aerobic and muscle training component  Aerobic- will increase vagal tone and control of heart rate  Muscle training of legs- will help with venous return   Will request monthly evaluation of level of exercise tolerance       - Medications to avoid  Numerous anti depressants and ADHD medications that inhibit NE transporter  TCA  SNRI - duloxetine, venlafaxine, milnacipram  NRI - atomoxetine, reboxetine  These can provoke POTS in susceptible healthy volunteers            2  Palpitation suggestive of SVT  This is the patient's 2nd type of palpitation  This can happen even when she is sitting or laying in bed  This is sudden in onset    This has never been evaluated  Would recommend a 30 day event monitor - this has been frequent enough especially when beta-blockers are being reduced  In addition can look at an exercise stress test to see if any arrhythmias induced  The mother is this question about WPW syndrome-The EKG does not show any evidence of preexcitation          3   Obesity  Patient's BMI is over 35  She will definitely benefit from organized exercise regimen          Summary of my recommendations at the current time:  Echocardiogram  Holter for 48 hours  Event monitor for 30 days if nothing obvious found on Holter  Exercise stress test with EP supervision to look for pre-excitation  Water intake as advised  Compression stockings advised  Consultation for the [facility] protocol  Upon medications that are in NRI or SNRI group       Very detailed discussion done with the patient and her mother  A total of 90 minutes was spent in the case of which 70% of the time was spent in discussing the current management protocol  They understand that we are trying to reestablish with the diagnosis at the same time as we are treating her symptoms

## 2019-02-22 ENCOUNTER — TELEPHONE (OUTPATIENT)
Dept: CARDIOLOGY CLINIC | Facility: CLINIC | Age: 24
End: 2019-02-22

## 2019-02-22 NOTE — TELEPHONE ENCOUNTER
P/C to pt's mother-checking to see if she started the propranolol last evening in place of the metoproliol  Pt started the 10 mg and awoke this morning with sweating and "increased HR", but per mom, only seems to be in the 80's  (?)  Pt says she is "already not feeling 100%"  Pt is asking-will the down titration take place based on how she is doing on the propranolol, or is it a definite schedule to follow? If this is a definite schedule, she will do 5 days of the 10 mg TID, the decrease to 5 mg TID and after 5 days of that, will decrease again to the 2 5 mg TID,   Is this correct, and how long should she stay on the 2 5 mg TID?

## 2019-02-23 RX ORDER — PROPRANOLOL HYDROCHLORIDE 10 MG/1
10 TABLET ORAL 3 TIMES DAILY
Qty: 90 TABLET | Refills: 3 | Status: SHIPPED | OUTPATIENT
Start: 2019-02-23 | End: 2019-10-11

## 2019-02-24 NOTE — PROGRESS NOTES
Cardiology Consultation     Arnulfo Hernandez  [id number]  1995  [facility] [address]    1  Autonomic dysfunction  Compression Stocking    Echo complete with contrast if indicated    Stress test only, exercise    Tilt table    Holter monitor - 48 hour   2   POTS (postural orthostatic tachycardia syndrome)  POCT ECG       History of present illness    The patient has been referred to me by Dr Rekha Horvath for evaluation of autonomic dysfunction and POTS     The patient carries this diagnosis for over 5 years  However there is no tilt study, holter when the diagnosis was originally made    She does give a history of lightheadedness and occasional headache  There is also history of short-term memory loss after concussion  She does not have a history of chest pain  There is occasional palpitation  There is no history of early satiety or bloating    There has been a gradual weight gain  This has been worsened when she was on  fludrocortisone according to her    She works as a   She is getting tired more often when she needs to stand for long periods of time    Most importantly there are episodes of palpitation which would come suddenly  This can happen when she is sleeping at night  This can also happen when she is sitting and watching movies  According to her and her mother suddenly are heart rate will be in the 140s      The patient is not complaining of anginal like chest pain or chest pressure  There is no worsening orthopnea, paroxysmal nocturnal dyspnea  There is no leg swelling    Patient does get palpitation   There is no history of presyncope or syncope  There is rare history of transient  lightheadedness  When patient has these palpitations, it is associated with exertional intolerance      There is history of snoring at night  There is history of morning fatigability  There is occasional history of daytime sleepiness        Patient Active Problem List   Diagnosis    POTS (postural orthostatic tachycardia syndrome)     Past Medical History:   Diagnosis Date    POTS (postural orthostatic tachycardia syndrome)      Social History     Socioeconomic History    Marital status: Single     Spouse name: Not on file    Number of children: Not on file    Years of education: Not on file    Highest education level: Not on file   Occupational History    Not on file   Social Needs    Financial resource strain: Not on file    Food insecurity:     Worry: Not on file     Inability: Not on file    Transportation needs:     Medical: Not on file     Non-medical: Not on file   Tobacco Use    Smoking status: Current Every Day Smoker    Smokeless tobacco: Never Used   Substance and Sexual Activity    Alcohol use: No    Drug use: Yes     Types: Marijuana    Sexual activity: Not on file   Lifestyle    Physical activity:     Days per week: Not on file     Minutes per session: Not on file    Stress: Not on file   Relationships    Social connections:     Talks on phone: Not on file     Gets together: Not on file     Attends Voodoo service: Not on file     Active member of club or organization: Not on file     Attends meetings of clubs or organizations: Not on file     Relationship status: Not on file    Intimate partner violence:     Fear of current or ex partner: Not on file     Emotionally abused: Not on file     Physically abused: Not on file     Forced sexual activity: Not on file   Other Topics Concern    Not on file   Social History Narrative    Not on file      No family history on file  No past surgical history on file      Current Outpatient Medications:     fludrocortisone (FLORINEF) 0 1 mg tablet, 1 tab daily, Disp: 90 tablet, Rfl: 3    sertraline (ZOLOFT) 100 mg tablet, Take 100 mg by mouth daily Take 1 and 1/2 tabs daily , Disp: , Rfl:     propranolol (INDERAL) 10 mg tablet, Take 1 tablet (10 mg total) by mouth 3 (three) times a day, Disp: 90 tablet, Rfl: 3  No Known Allergies  Vitals:    02/21/19 1703   BP: 90/64   Pulse: 60   Weight: 94 7 kg (208 lb 12 8 oz)   Height: 5' 3" (1 6 m)           Labs:  Normal kidney function  Normal liver function  Need to check hemoglobin, hematocrit , TSH    Imaging: No results found  Review of Systems:  Review of Systems   All other systems reviewed and are negative  As described in my history of present illness    Physical Exam:  Physical Exam   Constitutional: She is oriented to person, place, and time  She appears well-developed and well-nourished  No distress  Not in any distress at the current time   HENT:   Head: Normocephalic and atraumatic  Right Ear: External ear normal    Left Ear: External ear normal    Nose: Nose normal    Mouth/Throat: Uvula is midline and mucous membranes are normal    Posterior pharynx is crowded   Eyes: Pupils are equal, round, and reactive to light  Conjunctivae, EOM and lids are normal  No scleral icterus  No pallor  No cyanosis  No icterus   Neck: Trachea normal and normal range of motion  No JVD present  Carotid bruit is not present  No thyromegaly present  No jugular lymphadenopathy  Short thick neck   Cardiovascular: Normal rate, regular rhythm, S1 normal, S2 normal, normal heart sounds, intact distal pulses and normal pulses  PMI is not displaced  Exam reveals no gallop, no S3, no S4 and no friction rub  No murmur heard  Pulmonary/Chest: Effort normal and breath sounds normal  No accessory muscle usage  No respiratory distress  She has no decreased breath sounds  She has no wheezes  She has no rhonchi  She has no rales  She exhibits no tenderness  Abdominal: Soft  Normal appearance and bowel sounds are normal  She exhibits no distension and no mass  There is no splenomegaly or hepatomegaly  There is no tenderness  Musculoskeletal: Normal range of motion   She exhibits no edema, tenderness or deformity  Lymphadenopathy:     She has no cervical adenopathy  Neurological: She is alert and oriented to person, place, and time  Facial symmetry is retained  Extraocular movements are retained  Head neck tongue and palate movement are retained and symmetric   Skin: Skin is intact  No abrasion, no lesion and no rash noted  No erythema  Nails show no clubbing  Psychiatric: She has a normal mood and affect  Her speech is normal and behavior is normal  Thought content normal        Discussion/Summary:    1   History of palpitation, autonomic dysfunction  Patient also carries a diagnosis of POTS  I do not have access to any tilt-table study, Holter which led to the current diagnosis  At the current time we will treat her symptoms and also try to establish a diagnosis      Diagnostic procedures:  Echocardiogram  Holter 48 hours - to look at variations in heart rate and the circadian rhythm  Tilt-table study- can wait till the patient is off beta-blockers          Therapeutic interventions  - keep water intake between 2 5-3 L    - can use half as Gatorade or Powerade  At the current time will avoid using extra salt    -use of beta-blockers   patient is currently not tachycardic  However will still recommend to discontinue metoprolol  Start propranolol 10 mg 3 times daily  If symptomatic really doing well can reduce it to 5 mg 3 times a day by a gradual tapering method       - Pressure stockings  Bilateral  Knee high, thigh high or waist high  10 mm, 20 mm, 30 mm, 40 mm Hg  Helps with venous return        - Exercise -  [place] protocol  Set up appointment with Ms Elvira Zarate / Ms Aliza Marroquin  This has both aerobic and muscle training component  Aerobic- will increase vagal tone and control of heart rate  Muscle training of legs- will help with venous return   Will request monthly evaluation of level of exercise tolerance       - Medications to avoid  Numerous anti depressants and ADHD medications that inhibit NE transporter  TCA  SNRI - duloxetine, venlafaxine, milnacipram  NRI - atomoxetine, reboxetine  These can provoke POTS in susceptible healthy volunteers            2  Palpitation suggestive of SVT  This is the patient's 2nd type of palpitation  This can happen even when she is sitting or laying in bed  This is sudden in onset    This has never been evaluated  Would recommend a 30 day event monitor - this has been frequent enough especially when beta-blockers are being reduced  In addition can look at an exercise stress test to see if any arrhythmias induced  The mother is this question about WPW syndrome-The EKG does not show any evidence of preexcitation          3   Obesity  Patient's BMI is over 35  She will definitely benefit from organized exercise regimen          Summary of my recommendations at the current time:  Echocardiogram  Holter for 48 hours  Event monitor for 30 days if nothing obvious found on Holter  Exercise stress test with EP supervision to look for pre-excitation  Water intake as advised  Compression stockings advised  Consultation for the [facility] protocol  Upon medications that are in NRI or SNRI group       Very detailed discussion done with the patient and her mother  A total of 90 minutes was spent in the case of which 70% of the time was spent in discussing the current management protocol  They understand that we are trying to reestablish with the diagnosis at the same time as we are treating her symptoms

## 2019-02-25 NOTE — TELEPHONE ENCOUNTER
P/C back from pt's mother-as soon as we hung up from talking, the patient had an episode of increased HR -116, they are not as concerned about that as they are the shortness of breath the patient is having  She feels she cannot catch her breath  This is the second episode like this since being on the propranolol

## 2019-02-25 NOTE — TELEPHONE ENCOUNTER
So the patient will do 10 mg TID for 5 days, then 5 mg TID for 5 days, then call with update to see if she should decrease, or should she automatically go down to the 2 5 mg TID after the 10 days of the decreasing dose?

## 2019-02-25 NOTE — TELEPHONE ENCOUNTER
P/C from pt's mom-pt had a rough 2 days, had a few tachycardic events, but was able to control them with her breathing  Pt is feeling really well, much better, as compared to when she was on the metoprolol-no fatigue  She is due to decrease the dose to 5 mg TID Tuesday this week  I will check on the Tilt Table and Stress for scheduling for about 2 weeks

## 2019-02-26 NOTE — TELEPHONE ENCOUNTER
Called pt's mother-pt seems better, and sx are few and far between  Pt gets some symptoms, but are not as bothersome  Advised pt's mother that this is a normal variation of how she should be feeling with medication change  Pt is doing down to the 5 mg TID dose of the propranolol today  I will be scheduling the TTT and ETT and call pt with dates

## 2019-02-27 NOTE — TELEPHONE ENCOUNTER
Left a message at [phone] to have them call pt's mother, Negin Umaña @ [phone]  Per Dr Bambi Torres, pt needs ETT same day as the TTT, and Dr Bambi Torres needs to be present for the ETT  This needs to be done next week when he is the rounder

## 2019-02-28 ENCOUNTER — TELEPHONE (OUTPATIENT)
Dept: CARDIOLOGY CLINIC | Facility: CLINIC | Age: 24
End: 2019-02-28

## 2019-02-28 NOTE — TELEPHONE ENCOUNTER
I spoke with Newton Liu, pt's mother-pt is scheduled for her exercise treadmill test and tilt table test on Tuesday, March 5, starting at 9:00 AM     Pt is on the propranolol 5 mg TID and is struggling with her symptoms yet-increased heart rate and shortness of breath  Instead of decreasing her dose again, she will stay on the 5 mg TID  Also, pt is asking if she can get a work note for both Monday, the day before her testing, and Tuesday as well  Thank you

## 2019-02-28 NOTE — TELEPHONE ENCOUNTER
Stress test and tilt table both scheduled for Tuesday March 5, 2019, starting at 9:00 AM     Will notify Dr Catrina Claude

## 2019-03-02 ENCOUNTER — HOSPITAL ENCOUNTER (EMERGENCY)
Facility: HOSPITAL | Age: 24
Discharge: HOME | End: 2019-03-02
Attending: EMERGENCY MEDICINE | Admitting: EMERGENCY MEDICINE
Payer: COMMERCIAL

## 2019-03-02 ENCOUNTER — APPOINTMENT (EMERGENCY)
Dept: RADIOLOGY | Facility: HOSPITAL | Age: 24
End: 2019-03-02
Payer: COMMERCIAL

## 2019-03-02 VITALS
HEART RATE: 69 BPM | RESPIRATION RATE: 17 BRPM | HEIGHT: 63 IN | BODY MASS INDEX: 38.55 KG/M2 | DIASTOLIC BLOOD PRESSURE: 76 MMHG | SYSTOLIC BLOOD PRESSURE: 122 MMHG | OXYGEN SATURATION: 100 % | WEIGHT: 217.59 LBS | TEMPERATURE: 97.5 F

## 2019-03-02 DIAGNOSIS — R55 NEAR SYNCOPE: ICD-10-CM

## 2019-03-02 DIAGNOSIS — R07.9 CHEST PAIN: Primary | ICD-10-CM

## 2019-03-02 DIAGNOSIS — R00.2 PALPITATIONS: ICD-10-CM

## 2019-03-02 DIAGNOSIS — T50.905A MEDICATION REACTION: ICD-10-CM

## 2019-03-02 LAB
ALBUMIN SERPL BCP-MCNC: 3.6 G/DL (ref 3.5–5)
ALP SERPL-CCNC: 95 U/L (ref 46–116)
ALT SERPL W P-5'-P-CCNC: 21 U/L (ref 12–78)
ANION GAP SERPL CALCULATED.3IONS-SCNC: 8 MMOL/L (ref 4–13)
AST SERPL W P-5'-P-CCNC: 15 U/L (ref 5–45)
ATRIAL RATE: 69 BPM
ATRIAL RATE: 69 BPM
BASOPHILS # BLD AUTO: 0.03 THOUSANDS/ΜL (ref 0–0.1)
BASOPHILS NFR BLD AUTO: 0 % (ref 0–1)
BILIRUB SERPL-MCNC: 0.3 MG/DL (ref 0.2–1)
BUN SERPL-MCNC: 8 MG/DL (ref 5–25)
CALCIUM SERPL-MCNC: 9.6 MG/DL (ref 8.3–10.1)
CHLORIDE SERPL-SCNC: 104 MMOL/L (ref 100–108)
CO2 SERPL-SCNC: 26 MMOL/L (ref 21–32)
CREAT SERPL-MCNC: 0.68 MG/DL (ref 0.6–1.3)
DEPRECATED D DIMER PPP: <270 NG/ML (FEU)
EOSINOPHIL # BLD AUTO: 0.14 THOUSAND/ΜL (ref 0–0.61)
EOSINOPHIL NFR BLD AUTO: 2 % (ref 0–6)
ERYTHROCYTE [DISTWIDTH] IN BLOOD BY AUTOMATED COUNT: 12.8 % (ref 11.6–15.1)
GFR SERPL CREATININE-BSD FRML MDRD: 125 ML/MIN/1.73SQ M
GLUCOSE SERPL-MCNC: 97 MG/DL (ref 65–140)
HCT VFR BLD AUTO: 44.7 % (ref 34.8–46.1)
HGB BLD-MCNC: 14.6 G/DL (ref 11.5–15.4)
IMM GRANULOCYTES # BLD AUTO: 0.03 THOUSAND/UL (ref 0–0.2)
IMM GRANULOCYTES NFR BLD AUTO: 0 % (ref 0–2)
LYMPHOCYTES # BLD AUTO: 1.93 THOUSANDS/ΜL (ref 0.6–4.47)
LYMPHOCYTES NFR BLD AUTO: 25 % (ref 14–44)
MAGNESIUM SERPL-MCNC: 2 MG/DL (ref 1.6–2.6)
MCH RBC QN AUTO: 30.7 PG (ref 26.8–34.3)
MCHC RBC AUTO-ENTMCNC: 32.7 G/DL (ref 31.4–37.4)
MCV RBC AUTO: 94 FL (ref 82–98)
MONOCYTES # BLD AUTO: 0.53 THOUSAND/ΜL (ref 0.17–1.22)
MONOCYTES NFR BLD AUTO: 7 % (ref 4–12)
NEUTROPHILS # BLD AUTO: 4.98 THOUSANDS/ΜL (ref 1.85–7.62)
NEUTS SEG NFR BLD AUTO: 66 % (ref 43–75)
NRBC BLD AUTO-RTO: 0 /100 WBCS
P AXIS: 53 DEGREES
P AXIS: 54 DEGREES
PLATELET # BLD AUTO: 286 THOUSANDS/UL (ref 149–390)
PMV BLD AUTO: 11.5 FL (ref 8.9–12.7)
POTASSIUM SERPL-SCNC: 4 MMOL/L (ref 3.5–5.3)
PR INTERVAL: 134 MS
PR INTERVAL: 146 MS
PROT SERPL-MCNC: 7.6 G/DL (ref 6.4–8.2)
QRS AXIS: 65 DEGREES
QRS AXIS: 65 DEGREES
QRSD INTERVAL: 80 MS
QRSD INTERVAL: 82 MS
QT INTERVAL: 400 MS
QT INTERVAL: 402 MS
QTC INTERVAL: 428 MS
QTC INTERVAL: 430 MS
RBC # BLD AUTO: 4.75 MILLION/UL (ref 3.81–5.12)
SODIUM SERPL-SCNC: 138 MMOL/L (ref 136–145)
T WAVE AXIS: 45 DEGREES
T WAVE AXIS: 46 DEGREES
TROPONIN I SERPL-MCNC: <0.02 NG/ML
TROPONIN I SERPL-MCNC: <0.02 NG/ML
TSH SERPL DL<=0.05 MIU/L-ACNC: 2.9 UIU/ML (ref 0.36–3.74)
VENTRICULAR RATE: 69 BPM
VENTRICULAR RATE: 69 BPM
WBC # BLD AUTO: 7.64 THOUSAND/UL (ref 4.31–10.16)

## 2019-03-02 PROCEDURE — 96360 HYDRATION IV INFUSION INIT: CPT

## 2019-03-02 PROCEDURE — 84443 ASSAY THYROID STIM HORMONE: CPT | Performed by: EMERGENCY MEDICINE

## 2019-03-02 PROCEDURE — 84484 ASSAY OF TROPONIN QUANT: CPT | Performed by: EMERGENCY MEDICINE

## 2019-03-02 PROCEDURE — 83735 ASSAY OF MAGNESIUM: CPT | Performed by: EMERGENCY MEDICINE

## 2019-03-02 PROCEDURE — 85379 FIBRIN DEGRADATION QUANT: CPT | Performed by: EMERGENCY MEDICINE

## 2019-03-02 PROCEDURE — 36415 COLL VENOUS BLD VENIPUNCTURE: CPT

## 2019-03-02 PROCEDURE — 93010 ELECTROCARDIOGRAM REPORT: CPT | Performed by: INTERNAL MEDICINE

## 2019-03-02 PROCEDURE — 99285 EMERGENCY DEPT VISIT HI MDM: CPT

## 2019-03-02 PROCEDURE — 80053 COMPREHEN METABOLIC PANEL: CPT | Performed by: EMERGENCY MEDICINE

## 2019-03-02 PROCEDURE — 71046 X-RAY EXAM CHEST 2 VIEWS: CPT

## 2019-03-02 PROCEDURE — 93005 ELECTROCARDIOGRAM TRACING: CPT

## 2019-03-02 PROCEDURE — 85025 COMPLETE CBC W/AUTO DIFF WBC: CPT | Performed by: EMERGENCY MEDICINE

## 2019-03-02 RX ADMIN — SODIUM CHLORIDE 1000 ML: 0.9 INJECTION, SOLUTION INTRAVENOUS at 14:19

## 2019-03-02 NOTE — ED PROVIDER NOTES
History  Chief Complaint   Patient presents with    Chest Pain     hx of POTS  pt feels as though her heart is racing  pt feel as though she could pass out      61-year-old female with past medical history of positional orthostatic tachycardia syndrome presents for evaluation near syncope  She states that today she felt as though her heart was racing  She felt near syncopal, lightheaded as though she could pass out  Felt pale, no diaphoresis, was able to sit down and felt improved  She denies chest pain but states she could feel her heart racing  She denies shortness of breath  She has no recent illness, no fevers or chills  She admits to possible dehydration lately  No nausea or vomiting, no diarrhea or constipation  She is following up with Cardiology, recent medication change with propanolol as they are trying to taper and hold her propanolol until a stress test can be performed  Patient felt this way after the decrease in her propanolol occurred  Prior to Admission Medications   Prescriptions Last Dose Informant Patient Reported? Taking?   propranolol (INDERAL) 10 mg tablet   No Yes   Sig: Take 1 tablet (10 mg total) by mouth 3 (three) times a day   sertraline (ZOLOFT) 100 mg tablet  Self Yes Yes   Sig: Take 100 mg by mouth daily Take 1 and 1/2 tabs daily       Facility-Administered Medications: None       Past Medical History:   Diagnosis Date    POTS (postural orthostatic tachycardia syndrome)     POTS (postural orthostatic tachycardia syndrome)        History reviewed  No pertinent surgical history  History reviewed  No pertinent family history  I have reviewed and agree with the history as documented  Social History     Tobacco Use    Smoking status: Current Every Day Smoker    Smokeless tobacco: Never Used   Substance Use Topics    Alcohol use: No    Drug use: Yes     Types: Marijuana        Review of Systems   Constitutional: Positive for fatigue   Negative for chills, diaphoresis and fever  HENT: Negative for congestion and sore throat  Respiratory: Negative for apnea, cough, chest tightness and shortness of breath  Cardiovascular: Negative for chest pain, palpitations and leg swelling  Gastrointestinal: Negative for abdominal pain, constipation, diarrhea, nausea and vomiting  Genitourinary: Negative for dysuria and flank pain  Musculoskeletal: Negative for back pain and neck pain  Skin: Positive for pallor  Negative for color change and rash  Allergic/Immunologic: Negative for immunocompromised state  Neurological: Positive for light-headedness  Negative for dizziness, syncope (Near syncope only), weakness, numbness and headaches  Physical Exam  Physical Exam   Constitutional: She is oriented to person, place, and time  She appears well-developed and well-nourished  Non-toxic appearance  She does not appear ill  No distress  HENT:   Head: Normocephalic and atraumatic  Eyes: Pupils are equal, round, and reactive to light  Conjunctivae and EOM are normal  Right eye exhibits no discharge  Left eye exhibits no discharge  No scleral icterus  Neck: Normal range of motion  Neck supple  No JVD present  Cardiovascular: Normal rate, regular rhythm, normal heart sounds and intact distal pulses  Exam reveals no gallop and no friction rub  No murmur heard  Pulmonary/Chest: Effort normal and breath sounds normal  No accessory muscle usage  No respiratory distress  She has no decreased breath sounds  She has no wheezes  She has no rhonchi  She has no rales  She exhibits no tenderness  Abdominal: Soft  Bowel sounds are normal  She exhibits no distension  There is no tenderness  There is no rebound and no guarding  Musculoskeletal: Normal range of motion  She exhibits no edema, tenderness or deformity  Right lower leg: She exhibits no edema  Left lower leg: She exhibits no edema     Neurological: She is alert and oriented to person, place, and time  No cranial nerve deficit  Skin: Skin is warm and dry  No rash noted  She is not diaphoretic  No erythema  No pallor  Psychiatric: She has a normal mood and affect  Her behavior is normal    Vitals reviewed  Vital Signs  ED Triage Vitals   Temperature Pulse Respirations Blood Pressure SpO2   03/02/19 1251 03/02/19 1249 03/02/19 1249 03/02/19 1249 03/02/19 1249   97 5 °F (36 4 °C) 76 20 132/88 100 %      Temp src Heart Rate Source Patient Position - Orthostatic VS BP Location FiO2 (%)   -- 03/02/19 1249 03/02/19 1417 03/02/19 1417 --    Monitor Lying Left arm       Pain Score       03/02/19 1249       No Pain           Vitals:    03/02/19 1249 03/02/19 1417 03/02/19 1517   BP: 132/88 122/82 122/76   Pulse: 76 65 69   Patient Position - Orthostatic VS:  Lying Lying       Visual Acuity      ED Medications  Medications   sodium chloride 0 9 % bolus 1,000 mL (0 mL Intravenous Stopped 3/2/19 1537)       Diagnostic Studies  Results Reviewed     Procedure Component Value Units Date/Time    Troponin I [[id number]]  (Normal) Collected:  03/02/19 1627    Lab Status:  Final result Specimen:  Blood from Arm, Right Updated:  03/02/19 1649     Troponin I <0 02 ng/mL     D-dimer, quantitative [[id number]]  (Normal) Collected:  03/02/19 1417    Lab Status:  Final result Specimen:  Blood from Arm, Right Updated:  03/02/19 1503     D-Dimer, Quant <270 ng/ml (FEU)     TSH [[id number]]  (Normal) Collected:  03/02/19 1323    Lab Status:  Final result Specimen:  Blood from Arm, Right Updated:  03/02/19 1440     TSH 3RD GENERATON 2 896 uIU/mL     Narrative:       Patients undergoing fluorescein dye angiography may retain small amounts of fluorescein in the body for 48-72 hours post procedure  Samples containing fluorescein can produce falsely depressed TSH values  If the patient had this procedure,a specimen should be resubmitted post fluorescein clearance      Magnesium [[id number]]  (Normal) Collected:  03/02/19 1323    Lab Status:  Final result Specimen:  Blood from Arm, Right Updated:  03/02/19 1440     Magnesium 2 0 mg/dL     Troponin I [[id number]]  (Normal) Collected:  03/02/19 1323    Lab Status:  Final result Specimen:  Blood from Arm, Right Updated:  03/02/19 1353     Troponin I <0 02 ng/mL     Comprehensive metabolic panel [[id number]] Collected:  03/02/19 1323    Lab Status:  Final result Specimen:  Blood from Arm, Right Updated:  03/02/19 1352     Sodium 138 mmol/L      Potassium 4 0 mmol/L      Chloride 104 mmol/L      CO2 26 mmol/L      ANION GAP 8 mmol/L      BUN 8 mg/dL      Creatinine 0 68 mg/dL      Glucose 97 mg/dL      Calcium 9 6 mg/dL      AST 15 U/L      ALT 21 U/L      Alkaline Phosphatase 95 U/L      Total Protein 7 6 g/dL      Albumin 3 6 g/dL      Total Bilirubin 0 30 mg/dL      eGFR 125 ml/min/1 73sq m     Narrative:       National Kidney Disease Education Program recommendations are as follows:  GFR calculation is accurate only with a steady state creatinine  Chronic Kidney disease less than 60 ml/min/1 73 sq  meters  Kidney failure less than 15 ml/min/1 73 sq  meters      CBC and differential [[id number]] Collected:  03/02/19 1323    Lab Status:  Final result Specimen:  Blood from Arm, Right Updated:  03/02/19 1339     WBC 7 64 Thousand/uL      RBC 4 75 Million/uL      Hemoglobin 14 6 g/dL      Hematocrit 44 7 %      MCV 94 fL      MCH 30 7 pg      MCHC 32 7 g/dL      RDW 12 8 %      MPV 11 5 fL      Platelets 279 Thousands/uL      nRBC 0 /100 WBCs      Neutrophils Relative 66 %      Immat GRANS % 0 %      Lymphocytes Relative 25 %      Monocytes Relative 7 %      Eosinophils Relative 2 %      Basophils Relative 0 %      Neutrophils Absolute 4 98 Thousands/µL      Immature Grans Absolute 0 03 Thousand/uL      Lymphocytes Absolute 1 93 Thousands/µL      Monocytes Absolute 0 53 Thousand/µL      Eosinophils Absolute 0 14 Thousand/µL      Basophils Absolute 0 03 Thousands/µL                  X-ray chest 2 views ED Interpretation by Kendall Turner DO (03/02 1451)   No evidence of cardiopulmonary disease on x-ray  Final Result by Wilfrid Reid MD (03/02 1523)      No acute cardiopulmonary disease  Workstation performed: [id number]                    Procedures  ECG 12 Lead Documentation  Date/Time: 3/2/2019 5:17 PM  Performed by: Kendall Turner DO  Authorized by: Kendall Turner DO     Indications / Diagnosis:  Near syncope  Previous ECG:     Previous ECG:  Compared to current    Comparison ECG info:  Dec 4 2018    Similarity:  No change  Interpretation:     Interpretation: normal    Rate:     ECG rate:  69    ECG rate assessment: normal    Rhythm:     Rhythm: sinus rhythm    Ectopy:     Ectopy: none    QRS:     QRS axis:  Normal  Conduction:     Conduction: normal    ST segments:     ST segments:  Normal  T waves:     T waves: normal    ECG 12 Lead Documentation  Date/Time: 3/2/2019 5:18 PM  Performed by: Kendall Turner DO  Authorized by: Kendall Turner DO     Indications / Diagnosis:  Near syncope  ECG reviewed by me, the ED Provider: yes    Patient location:  ED  Previous ECG:     Previous ECG:  Compared to current    Comparison ECG info:  Earlier today    Similarity:  No change    Comparison to cardiac monitor: Yes    Interpretation:     Interpretation: normal    Rate:     ECG rate:  69    ECG rate assessment: normal    Rhythm:     Rhythm: sinus rhythm    Ectopy:     Ectopy: none    QRS:     QRS axis:  Normal    QRS intervals:  Normal  Conduction:     Conduction: normal    ST segments:     ST segments:  Normal  T waves:     T waves: normal      Patient has had the same conduction since December with the slightest similarity to a LBBB evidenct in V2 and AVL  Patient has been stable and follows with cardiology closely          Phone Contacts  ED Phone Contact    ED Course  ED Course as of Mar 09 0640   Sat Mar 02, 2019   1403 Troponin I: <0 02   1504 D-DIMER QUANTITATIVE: <270   1504 Magnesium: 2 0   1643 Patient feels improved since arriving to the emergency department  She has had no return of her chest pain or near syncope feeling  Agreeable to wait for delta troponin, delta EKG  Updated on completely normal lab work thus far       1652 Troponin I: <0 02   1714 Repeat troponin, repeat EKG are normal   Discussed workup with patient  She is stable for discharge home  Advised good return precautions in case of return of conditions, any signs of cardiovascular disease  HEART Risk Score      Most Recent Value   History  1 Filed at: 03/09/2019 0636   ECG  1 Filed at: 03/09/2019 0636   Age  0 Filed at: 03/09/2019 0636   Risk Factors  1 Filed at: 03/09/2019 0636   Troponin  0 Filed at: 03/09/2019 0636   Heart Score Risk Calculator   History  1 Filed at: 03/09/2019 0636   ECG  1 Filed at: 03/09/2019 0636   Age  0 Filed at: 03/09/2019 0636   Risk Factors  1 Filed at: 03/09/2019 0636   Troponin  0 Filed at: 03/09/2019 0636   HEART Score  3 Filed at: 03/09/2019 0636   HEART Score  3 Filed at: 03/09/2019 3515                            MDM  Number of Diagnoses or Management Options  Chest pain:   Medication reaction:   Near syncope:   Palpitations:   Diagnosis management comments: Near syncope in a patient with POTS syndrome  This is also with this scenario of recent medication change by Cardiology  Likely secondary to medication reaction  Patient will be evaluated for ACS with ACS workup including delta troponin, delta EKG  She has a heart score of 3 and is low enough risk that evaluation performed here with delta troponin delta EKG is sufficient to rule out ACS  She will be given IV fluid in case of dehydration  Orthostatics performed  Patient feels improved after IV fluid and time here  ACS workup including delta troponin, delta EKG are negative  This is likely secondary to medication changes by Cardiology    Patient will be discharge in advised good return precautions in case of signs of ACS, arrhythmia, or repeat syncope  Advised follow-up with Cardiology regarding medication changes for appropriate medication treatment  Discussed with patient and they understood the risks and benefits of discharge  Patient had opportunity to ask questions regarding care and discharge instructions and had no further questions  Advised follow up with PCP/cards, advised returning if worsening, and discussed disease specific return precautions  Patient understood discharge instructions  Amount and/or Complexity of Data Reviewed  Clinical lab tests: ordered and reviewed  Tests in the radiology section of CPT®: ordered and reviewed        Disposition  Final diagnoses:   Chest pain   Palpitations   Medication reaction   Near syncope     Time reflects when diagnosis was documented in both MDM as applicable and the Disposition within this note     Time User Action Codes Description Comment    3/2/2019  5:15 PM Coppersmith, Pedro Sue Add [R07 9] Chest pain     3/2/2019  5:15 PM Coppersmith, Pedro [place] Add [R00 2] Palpitations     3/2/2019  5:15 PM Coppersmith, Pedro [place] Add [T50 905A] Medication reaction     3/2/2019  5:16 PM Eric Scottelson Add [R55] Near syncope       ED Disposition     ED Disposition Condition Date/Time Comment    Discharge Stable Sat Mar 2, 2019  5:15 PM Arnulfo Hernandez discharge to home/self care              Follow-up Information     Follow up With Specialties Details Why Contact Info Additional [address], [address] [facility] Nephrology, Nurse Practitioner Schedule an appointment as soon as possible for a visit   Willie [facility] [address] Emergency Department Emergency Medicine  If symptoms worsen: syncope, chest pain, difficulty breathing, etc [address] [facility] [id number]  [address] [place] [address] ED, [address] [place], [place], [address] your EP physician and cardiologist               Discharge Medication List as of 3/2/2019  5:17 PM      CONTINUE these medications which have NOT CHANGED    Details   propranolol (INDERAL) 10 mg tablet Take 1 tablet (10 mg total) by mouth 3 (three) times a day, Starting Sat 2/23/2019, Normal      sertraline (ZOLOFT) 100 mg tablet Take 100 mg by mouth daily Take 1 and 1/2 tabs daily , Historical Med           No discharge procedures on file      ED Provider  Electronically Signed by           Darrel Sales DO  03/09/19 2829

## 2019-03-04 ENCOUNTER — TELEPHONE (OUTPATIENT)
Dept: NON INVASIVE DIAGNOSTICS | Facility: HOSPITAL | Age: 24
End: 2019-03-04

## 2019-03-05 ENCOUNTER — HOSPITAL ENCOUNTER (OUTPATIENT)
Dept: NON INVASIVE DIAGNOSTICS | Facility: HOSPITAL | Age: 24
Discharge: HOME | End: 2019-03-05
Attending: INTERNAL MEDICINE
Payer: COMMERCIAL

## 2019-03-05 DIAGNOSIS — G90.9 AUTONOMIC DYSFUNCTION: ICD-10-CM

## 2019-03-05 LAB
CHEST PAIN STATEMENT: NORMAL
MAX DIASTOLIC BP: 68 MMHG
MAX HEART RATE: 196 BPM
MAX PREDICTED HEART RATE: 198 BPM
MAX. SYSTOLIC BP: 140 MMHG
PROTOCOL NAME: NORMAL
TARGET HR FORMULA: NORMAL
TEST INDICATION: NORMAL
TIME IN EXERCISE PHASE: NORMAL

## 2019-03-05 PROCEDURE — 93016 CV STRESS TEST SUPVJ ONLY: CPT | Performed by: INTERNAL MEDICINE

## 2019-03-05 PROCEDURE — 93018 CV STRESS TEST I&R ONLY: CPT | Performed by: INTERNAL MEDICINE

## 2019-03-05 PROCEDURE — 93660 TILT TABLE EVALUATION: CPT | Performed by: INTERNAL MEDICINE

## 2019-03-05 PROCEDURE — 93660 TILT TABLE EVALUATION: CPT

## 2019-03-05 PROCEDURE — 93017 CV STRESS TEST TRACING ONLY: CPT

## 2019-03-06 ENCOUNTER — TELEPHONE (OUTPATIENT)
Dept: CARDIOLOGY CLINIC | Facility: CLINIC | Age: 24
End: 2019-03-06

## 2019-03-06 DIAGNOSIS — I49.8 POTS (POSTURAL ORTHOSTATIC TACHYCARDIA SYNDROME): Primary | ICD-10-CM

## 2019-03-06 NOTE — TELEPHONE ENCOUNTER
Pt is calling for her results of her testing and the follow up from here  Pt had her regular stress test and tilt table test on Tuesday, 3/5/2019  Her echo is scheduled for Friday 3/22/2019 here in [place], and needs to schedule her 48 hour holter monitor  Thank you

## 2019-03-07 NOTE — TELEPHONE ENCOUNTER
Called pt, informed her stress test was normal and tilt show "suggested delayed hypotension" per Dr Catrina Claude  Suggestions are the same as in F/U- water intake, compression stockings, deacon protocol and continue meds   Will put in referral for PT

## 2019-03-13 ENCOUNTER — EVALUATION (OUTPATIENT)
Dept: PHYSICAL THERAPY | Facility: CLINIC | Age: 24
End: 2019-03-13
Payer: COMMERCIAL

## 2019-03-13 DIAGNOSIS — I49.8 POTS (POSTURAL ORTHOSTATIC TACHYCARDIA SYNDROME): ICD-10-CM

## 2019-03-13 PROCEDURE — 97110 THERAPEUTIC EXERCISES: CPT | Performed by: PHYSICAL THERAPIST

## 2019-03-13 PROCEDURE — 97161 PT EVAL LOW COMPLEX 20 MIN: CPT | Performed by: PHYSICAL THERAPIST

## 2019-03-13 NOTE — PROGRESS NOTES
PT Evaluation     Today's date: 3/13/2019  Patient name: Pia Mensah  : 1995  MRN: [id number]  Referring provider: Amarjit Arevalo MD  Dx:   Encounter Diagnosis     ICD-10-CM    1  POTS (postural orthostatic tachycardia syndrome) R00 0 Ambulatory referral to Physical Therapy    I95 1                   Assessment  Assessment details: Pt reports to physical therapy with a diagnosis of POTS  At this time patient has poor tolerance to activity, decreased knowledge of home exercise program and Earle protocol, and overall reduced LE and UE strength  Pt will benefit from physical therpay in order to education to independence with progression of Earle protocol and establish home strengthening program  Initaited recumbent biking today  Pt was able to tolerate with no symptom increase at day one month one  Understanding of Dx/Px/POC: good   Prognosis: good    Goals  STG  Complete strengthening program independently within 4 visits  Demonstrate understanding of Earle protocol within 4 visits    LTG  Pt will be able to demonstrate independence with self progression through program wihtin 8 weeks    Plan  Plan details: Pt will educate to independence with [facility] protocol and go on hold as needed to return for further education as she needs  Patient would benefit from: skilled physical therapy  Planned therapy interventions: home exercise program, graded exercise and neuromuscular re-education  Frequency: 2x week  Duration in weeks: 2  Plan of Care beginning date: 3/13/2019  Plan of Care expiration date: 2019        Subjective Evaluation    History of Present Illness  Mechanism of injury: Pt was diagnosed with POTS 6 years ago  She recently felt like she was getting worse and then saw a new cardiologist Dr Jessica Steinberg  She recently switched her beta blocker medication  Dr Jessica Steinberg referred her to complete the [facility] protocol  Recently had stress test which was normal and itlt table test which showed hypotension  Pain  Current pain ratin  At worst pain ratin  Location: back/shoulder    Social Support  Stairs in house: yes   Lives in: multiple-level home  Lives with: parents    Employment status: working ( - prolonged standing makes it worse)  Exercise history: does not currently     Treatments  Previous treatment: physical therapy  Patient Goals  Patient goal: decrease symptoms          Co Morbidities:  Connective Tissue Dysfunction: No  Gastric Dysfunction:No  Sleep Abnormalities:Yes  Temperature Sensitive:Yes  Syncope:No    Current Recommendations:  Compression Socks:Yes  Fluid intake: 5-6 liters  Sleeping elevated:No  Salt intake: no increase  Beta Blocker:Yes    Symptoms with Exercise:  Ligthheaded: Yes  Chest Discomffort: Yes  Mental clouding: Yes  Headache:No  Nausea: Yes  Blurred or Tunnel Vision:Yes  Palpitations: Yes      Current/Previous Level of Exercise:    [facility] LE - 4/5 grossly    Starting Protocol for Day One:  Training Mode:recumbent bike    Month: Week:  Warm up: min     5 min     RPE:  2     HR:88  Base Pace: min  3     RPE:    4    HR:122  Recovery: min      2    RPE:  2       HR  Base pace:  Min   3     RPE:  4      HR:  Cool Down:    Mine5   RPE: 2         Hr:    Objective        Precautions: occassional near syncopal episodes    Daily Treatment Diary     Manual                                                                                   Exercise Diary                                                                                                                                                                                                                                                                                      Modalities

## 2019-03-20 ENCOUNTER — OFFICE VISIT (OUTPATIENT)
Dept: PHYSICAL THERAPY | Facility: CLINIC | Age: 24
End: 2019-03-20
Payer: COMMERCIAL

## 2019-03-20 DIAGNOSIS — I49.8 POTS (POSTURAL ORTHOSTATIC TACHYCARDIA SYNDROME): Primary | ICD-10-CM

## 2019-03-20 PROCEDURE — 97112 NEUROMUSCULAR REEDUCATION: CPT

## 2019-03-20 PROCEDURE — 97110 THERAPEUTIC EXERCISES: CPT

## 2019-03-20 NOTE — PROGRESS NOTES
Daily Note     Today's date: 3/20/2019  Patient name: Pia Mensah  : 1995  MRN: [id number]  Referring provider: Amarjit Arevalo MD  Dx:   Encounter Diagnosis     ICD-10-CM    1  POTS (postural orthostatic tachycardia syndrome) R00 0     I95 1          Subjective: Patient reports that she has been able to complete both biking and strength days with no symptoms during, does not some very mild symptoms after strength days  Objective: See treatment diary below  Precautions: occassional near syncopal episodes     Daily Treatment Diary      Manual                                                                                                                                                      Exercise Diary   3/20                      bridges 2x10                      SLR flexion 10x                      SLR abduction 10x                      clamshells GTB, 10x                      q-ped opposite UE/LE 10x                      u/l heel raise 10x                      bike See below                                                                                                                                                                                                                                                                                                                                                   Modalities                                                                                                   Bike-L1  5 min warm-up RPE 2  4 min base pace RPE 5  3 min recovery RPE 2  4 min base pace RPE 5  5 min cool down RPE 2      Assessment: Reviewed Earle protocol with patient  Reviewed strengthening exercises  Patient tolerated well  Did require cueing for proper form and to avoid compensation  Performed strengthening f/b biking  Patient able to perform both strength and cardio without any symptoms  Plan: Patient to f/u within 30 days to assess and progress as able

## 2019-03-22 ENCOUNTER — HOSPITAL ENCOUNTER (OUTPATIENT)
Dept: NON INVASIVE DIAGNOSTICS | Facility: CLINIC | Age: 24
Discharge: HOME | End: 2019-03-22
Payer: COMMERCIAL

## 2019-03-22 DIAGNOSIS — G90.9 AUTONOMIC DYSFUNCTION: ICD-10-CM

## 2019-03-22 PROCEDURE — 93306 TTE W/DOPPLER COMPLETE: CPT | Performed by: INTERNAL MEDICINE

## 2019-03-22 PROCEDURE — 93306 TTE W/DOPPLER COMPLETE: CPT

## 2019-03-27 ENCOUNTER — HOSPITAL ENCOUNTER (OUTPATIENT)
Dept: NON INVASIVE DIAGNOSTICS | Facility: HOSPITAL | Age: 24
Discharge: HOME | End: 2019-03-27
Attending: INTERNAL MEDICINE
Payer: COMMERCIAL

## 2019-03-27 DIAGNOSIS — G90.9 AUTONOMIC DYSFUNCTION: ICD-10-CM

## 2019-03-27 PROCEDURE — 93225 XTRNL ECG REC<48 HRS REC: CPT

## 2019-03-27 PROCEDURE — 93226 XTRNL ECG REC<48 HR SCAN A/R: CPT

## 2019-04-02 PROCEDURE — 93227 XTRNL ECG REC<48 HR R&I: CPT

## 2019-04-19 ENCOUNTER — OFFICE VISIT (OUTPATIENT)
Dept: PHYSICAL THERAPY | Facility: CLINIC | Age: 24
End: 2019-04-19
Payer: COMMERCIAL

## 2019-04-19 DIAGNOSIS — I49.8 POTS (POSTURAL ORTHOSTATIC TACHYCARDIA SYNDROME): Primary | ICD-10-CM

## 2019-04-19 PROCEDURE — 97110 THERAPEUTIC EXERCISES: CPT | Performed by: PHYSICAL THERAPIST

## 2019-06-16 DIAGNOSIS — G90.9 AUTONOMIC DYSFUNCTION: Primary | ICD-10-CM

## 2019-06-17 RX ORDER — PROPRANOLOL HYDROCHLORIDE 20 MG/1
TABLET ORAL
Qty: 45 TABLET | Refills: 3 | Status: SHIPPED | OUTPATIENT
Start: 2019-06-17 | End: 2019-10-10 | Stop reason: SDUPTHER

## 2019-07-02 NOTE — TELEPHONE ENCOUNTER
Spoke with pharmacist the medication itself is no longer being made they cannot get it, is there something else the patient could take instead? [de-identified] : Ms. 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 and was transferred to [facility] where she received R HyperCVAD. Her induction course was complicated by tutu fever. She received cycle 2 mid may. BM BX prior was morphologically remission but pcr was positive. Cycle 4 was completed. Prolonged course with fever, pericardial effusion, possible fungal pna. 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 She also had neurologic / mental status changes after MTX. She has an omaya. She had two MTX IT. She feels well today.  s/p step 1 for stem cell mobilization..in CR1..S/P auto stem cell transplant with tam 200 mg/m2 prep...\par \par 10/4/18 - 11/20/18 : 62F hx HTN, Obesity, Ph(+) ALL s/p R Hypercavd x4 cycles IT MTX x2 s/p stem cell collection w/ Step 1(HD vp16 plus arac) stem cell mobilization regime. \par FISH and PCR negative. s/p Tam-Auto on 12/16/16. Patient prior to presentation had severe burning right abdominal pain with vesicular lesions and was diagnosed with shingles and received a 10 day course of antiviral medications. \par Patient on presentation had residual neuropathic pain.  Patient presented to the ER with severe occipital parietal headaches with nausea and vomiting. \par Patient also admitted to easy bruising mouth sores and dark stools. \par CT Head was done for complaints of headache which was (+) for SDH. Neurosurgery was consulted on initial detection of SDH, recommendations to keep platelets >80,000 was made.  LGIB was attributed to profound thrombocytopenia. 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 A PICC line was placed for chemotherapy and was initiated with Inotuzamab on Day 1, 8 and 15. IVF and Allopurinol for TLS. The patient has had refractory thrombocytopenia. H L A platelets were infused when they were available. when H L A platelets were not available 1/2 unit of platelet was infused over 3 hours. \par Follow up CT Head on 10/11 was stable and the patient continued to receive 1/2 units of platelets over 3 hours every 12 hours. The patient received last dose of Inotuzamab Cycle 1 on 10/22. On 10/15 patient was febrile,  a CT of the chest/abd/pelvis was done which showed scattered patchy ground glass opacities in right upper and lower lobes, suggestive of infection, patient received a course of IV antibiotics for Pneumonia. On 11/2 Blood cultures were found to be (+) for Co-agulase (-) Staph and patient received a course of Vancomycin. \par Repeat CT Head was completed on 10/23 for follow up and showed some new bleeding into previous collection. Findings were discussed with Neuro Surgery. \par HLA platelets were administered when available for refractory thrombocytopenia. \par Cycle 2 Inotuzumab was started on 11/6 which was given on Days 1, 8, 15. \par Patient tolerated second cycle without any adverse reactions. Patient for possible future Haploidentical Allogeneic stem cell transplant after discharge. \par Pre-BMT testing completed prior to discharge: Echo, MUGA, Xray sinuses, 24-Hr Urine for Creatinine. \par Patient had intermittent severe headaches on 11/3 and 11/15, repeat CTs showed nearly resolved hemorrhage. Headaches were managed with analgesics Fentanyl patch 37 mcg/hr and Oxycodone IR 15 mg q3 prn prior to discharge home. She is on ponatinib QOD b/c of increased LFT's.  \par \par Patient underwent a haploidentical PBSCT on 2/13/19 (son), hospital course as follows:\par Ms. Galeas is a 62 year old female with a medical history of Ph + ALL, treated with HyperCVAD x 4 cycles, IT MTX x 2, and autologous peripheral blood stem cell transplant 12/16/16. Her transplant course was complicated by shingles. In October, 2018 she relapsed (confirmed via flow cytometry). She was treated with inotuzumab x 2 cycles. Her course was complicated by subdural hemorrhage, refractory thrombocytopenia requiring HLA matched platelets, pneumonia, and coag negative staph bacteremia (treated with vancomycin). She is now admitted for a haplo-identical peripheral blood stem cell transplant from her son. \par \par Upon admission, a TLC was placed in IR. Ms. Galeas received IV hydration, pain management, antiemetics, nutritional support, antiviral / antibacterial / antifungal / PCP / GI / VOD (SOS) prophylaxis. Labs were monitored on a daily basis, and she received electrolyte repletion and transfusional support as needed. \par \par Ms. Galeas had a relatively uncomplicated transplant course. A baseline CT of the head was done on admission given her history of SDH and refractory thrombocytopenia. The baseline CT was negative. Ms. Galeas did experience pancytopenia related to the high dose chemotherapy preparative regimen. Her thrombocytopenia was refractory, and was managed by infusing 1/2 unit of platelets twice daily over 3 hours. Ms. Galeas also experienced neutropenic fevers. When she became neutropenic, she was started on prophylactic ciprofloxacin. When she developed fevers, blood and urine cultures were sent, a CXR completed and the ciprofloxacin was changed to cefepime. Her cultures and CXR remained negative. \par \par On 2/13/19, Ms. Galeas received 280ml of fresh, mobilized, haplo-identical, HPC apheresis over approximately 1 hour. Cell counts as follows: \par Total MNCs (x 10^8/kg) = 4.36 \par CD34+ cells (x 10^6/kg) = 7.78 \par Cell Viability (%) = 100% \par \par Engraftment was noted on 2/27/19. Post engraftment, the cefepime and zarxio were discontinued. Tacrolimus levels and CMV PCR were monitored twice weekly. A FISH for Y was sent to determine chimerism, with results pending. 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 Today is + 139 days post Allo-PBSCT. Patient states she is doing well overall and offers no acute concerns today. She reports that her appetite has improved. Currently on tacrolimus 2 mg BID and valcyte 450 mg BID. Prednisone was discontinued on 6/26/19. Right PICC line in place and flushed daily. Denies fever, chills, nausea, vomiting, diarrhea, rash, mouth sores, dysuria or any signs of active bleeding. Denies SOB, chest pain or B/L LE edema. Remains compliant with post transplant diet and crowd restrictions.

## 2019-10-10 DIAGNOSIS — G90.9 AUTONOMIC DYSFUNCTION: ICD-10-CM

## 2019-10-11 RX ORDER — PROPRANOLOL HYDROCHLORIDE 10 MG/1
10 TABLET ORAL 3 TIMES DAILY
Qty: 90 TABLET | Refills: 1 | Status: SHIPPED | OUTPATIENT
Start: 2019-10-11 | End: 2020-03-04

## 2019-10-16 ENCOUNTER — OFFICE VISIT (OUTPATIENT)
Dept: URGENT CARE | Facility: CLINIC | Age: 24
End: 2019-10-16
Payer: COMMERCIAL

## 2019-10-16 VITALS
SYSTOLIC BLOOD PRESSURE: 110 MMHG | HEART RATE: 88 BPM | DIASTOLIC BLOOD PRESSURE: 62 MMHG | OXYGEN SATURATION: 97 % | TEMPERATURE: 98.7 F | RESPIRATION RATE: 18 BRPM | WEIGHT: 199 LBS | HEIGHT: 63 IN | BODY MASS INDEX: 35.26 KG/M2

## 2019-10-16 DIAGNOSIS — J01.90 ACUTE SINUSITIS, RECURRENCE NOT SPECIFIED, UNSPECIFIED LOCATION: Primary | ICD-10-CM

## 2019-10-16 PROCEDURE — 99203 OFFICE O/P NEW LOW 30 MIN: CPT | Performed by: PHYSICIAN ASSISTANT

## 2019-10-16 RX ORDER — AZITHROMYCIN 250 MG/1
TABLET, FILM COATED ORAL
Qty: 6 TABLET | Refills: 0 | Status: SHIPPED | OUTPATIENT
Start: 2019-10-16 | End: 2019-10-20

## 2019-10-16 NOTE — PROGRESS NOTES
[facility]        NAME: Makeda Arellano is a 27 y o  female  : 1995    MRN: [id number]  DATE: 2019  TIME: 4:59 PM    Assessment and Plan   Acute sinusitis, recurrence not specified, unspecified location [J01 90]  1  Acute sinusitis, recurrence not specified, unspecified location  azithromycin (ZITHROMAX) 250 mg tablet     Discussed viral vs bacterial  Likely viral at this point  Continue Claritin-D and mucinex as directed  If sx worsen within the next 3-4 days may start abx  Patient Instructions       Follow up with PCP in 3-5 days  Proceed to  ER if symptoms worsen  Chief Complaint     Chief Complaint   Patient presents with    Sinusitis     x 3 days         History of Present Illness       27year old female with past history of sinus infections presents with sore throat, congestion and cough x 3 days  The cough is productive of thick yellow sputum  She also has rhinorrhea of similarly thick yellow discharge  She complains of chest congestion, sinus pressure, and aural fullness  She has been taking Claritin-D and Aleve with little relief  She denies sick contacts  She denies fevers/chills, ear pain, chest pain, shortness of breath, myalgias  She has no history of asthma  Review of Systems   Review of Systems   Constitutional: Negative for chills, fatigue and fever  HENT: Positive for congestion, sinus pressure and sinus pain  Negative for ear pain, sore throat and trouble swallowing  Eyes: Negative for pain, discharge and redness  Respiratory: Positive for cough  Negative for chest tightness, shortness of breath and wheezing  Cardiovascular: Negative for chest pain, palpitations and leg swelling  Gastrointestinal: Negative for abdominal pain, diarrhea, nausea and vomiting  Musculoskeletal: Negative for arthralgias, joint swelling and myalgias  Skin: Negative for rash  Neurological: Negative for dizziness, weakness, numbness and headaches           Current Medications       Current Outpatient Medications:     propranolol (INDERAL) 10 mg tablet, Take 1 tablet (10 mg total) by mouth 3 (three) times a day, Disp: 90 tablet, Rfl: 1    sertraline (ZOLOFT) 100 mg tablet, Take 100 mg by mouth daily Take 1 and 1/2 tabs daily , Disp: , Rfl:     azithromycin (ZITHROMAX) 250 mg tablet, Take 2 tablets today then 1 tablet daily x 4 days, Disp: 6 tablet, Rfl: 0    levonorgestrel (RANDOLPH) 13 5 MG intrauterine device, 1 each by Intrauterine route, Disp: , Rfl:     Current Allergies     Allergies as of 10/16/2019    (No Known Allergies)            The following portions of the patient's history were reviewed and updated as appropriate: allergies, current medications, past family history, past medical history, past social history, past surgical history and problem list      Past Medical History:   Diagnosis Date    POTS (postural orthostatic tachycardia syndrome)     POTS (postural orthostatic tachycardia syndrome)        History reviewed  No pertinent surgical history  History reviewed  No pertinent family history  Medications have been verified  Objective   /62   Pulse 88   Temp 98 7 °F (37 1 °C) (Temporal)   Resp 18   Ht 5' 3" (1 6 m)   Wt 90 3 kg (199 lb)   SpO2 97%   BMI 35 25 kg/m²        Physical Exam     Physical Exam   Constitutional: She appears well-developed and well-nourished  HENT:   Head: Normocephalic  Right Ear: Hearing and tympanic membrane normal    Left Ear: Hearing and tympanic membrane normal    Nose: No mucosal edema  Mouth/Throat: Uvula is midline and mucous membranes are normal  Posterior oropharyngeal erythema (Postnasal drip in posterior pharynx visualized ) present  No oropharyngeal exudate  Cardiovascular: Normal rate, regular rhythm and intact distal pulses  Exam reveals no gallop and no friction rub  No murmur heard  Pulmonary/Chest: Effort normal and breath sounds normal  No stridor  No respiratory distress  She has no wheezes  She has no rales  Lymphadenopathy:     She has no cervical adenopathy  Skin: Skin is warm and dry  No erythema  Nursing note and vitals reviewed

## 2020-01-31 ENCOUNTER — HOSPITAL ENCOUNTER (EMERGENCY)
Facility: HOSPITAL | Age: 25
Discharge: HOME | End: 2020-01-31
Attending: EMERGENCY MEDICINE | Admitting: EMERGENCY MEDICINE
Payer: COMMERCIAL

## 2020-01-31 VITALS
DIASTOLIC BLOOD PRESSURE: 71 MMHG | TEMPERATURE: 98.2 F | HEART RATE: 92 BPM | WEIGHT: 125 LBS | RESPIRATION RATE: 20 BRPM | HEIGHT: 63 IN | OXYGEN SATURATION: 96 % | BODY MASS INDEX: 22.15 KG/M2 | SYSTOLIC BLOOD PRESSURE: 116 MMHG

## 2020-01-31 DIAGNOSIS — S86.112A GASTROCNEMIUS MUSCLE STRAIN, LEFT, INITIAL ENCOUNTER: ICD-10-CM

## 2020-01-31 DIAGNOSIS — S86.012A STRAIN OF LEFT ACHILLES TENDON, INITIAL ENCOUNTER: Primary | ICD-10-CM

## 2020-01-31 PROCEDURE — 99284 EMERGENCY DEPT VISIT MOD MDM: CPT | Performed by: NURSE PRACTITIONER

## 2020-01-31 PROCEDURE — 99282 EMERGENCY DEPT VISIT SF MDM: CPT

## 2020-01-31 RX ORDER — NAPROXEN 500 MG/1
500 TABLET ORAL 2 TIMES DAILY WITH MEALS
Qty: 10 TABLET | Refills: 0 | Status: SHIPPED | OUTPATIENT
Start: 2020-01-31 | End: 2020-02-17

## 2020-01-31 RX ORDER — NAPROXEN 250 MG/1
500 TABLET ORAL ONCE
Status: COMPLETED | OUTPATIENT
Start: 2020-01-31 | End: 2020-01-31

## 2020-01-31 RX ORDER — NAPROXEN 500 MG/1
500 TABLET ORAL 2 TIMES DAILY WITH MEALS
Qty: 10 TABLET | Refills: 0 | Status: SHIPPED | OUTPATIENT
Start: 2020-01-31 | End: 2020-01-31 | Stop reason: SDUPTHER

## 2020-01-31 RX ADMIN — NAPROXEN 500 MG: 250 TABLET ORAL at 23:50

## 2020-02-01 NOTE — ED PROVIDER NOTES
History  Chief Complaint   Patient presents with    Leg Pain     pt c/o of left lower leg pain after roller blading an hour ago  14-year-old female presents here today with a chief complaint of left lower leg pain after roller-skating  She was rollerblading and has not done this for many years  Because of the way she was rollerblading she had a sort of a valgus position while skating  After she finished she has tenderness over the left medial gastroc and left Achilles tendon  This is intact but likely caused a tendinitis          Prior to Admission Medications   Prescriptions Last Dose Informant Patient Reported? Taking?   levonorgestrel (RANDOLPH) 13 5 MG intrauterine device   Yes No   Si each by Intrauterine route   propranolol (INDERAL) 10 mg tablet   No No   Sig: Take 1 tablet (10 mg total) by mouth 3 (three) times a day   sertraline (ZOLOFT) 100 mg tablet  Self Yes No   Sig: Take 100 mg by mouth daily Take 1 and 1/2 tabs daily       Facility-Administered Medications: None       Past Medical History:   Diagnosis Date    POTS (postural orthostatic tachycardia syndrome)     POTS (postural orthostatic tachycardia syndrome)        History reviewed  No pertinent surgical history  History reviewed  No pertinent family history  I have reviewed and agree with the history as documented  Social History     Tobacco Use    Smoking status: Current Every Day Smoker     Packs/day: 0 25     Types: Cigarettes    Smokeless tobacco: Never Used   Substance Use Topics    Alcohol use: No    Drug use: Yes     Types: Marijuana        Review of Systems   Constitutional: Negative for activity change, fatigue and fever  HENT: Negative for congestion, ear pain, rhinorrhea and sore throat  Eyes: Negative  Respiratory: Negative for cough, shortness of breath and wheezing  Gastrointestinal: Negative for abdominal pain, diarrhea, nausea and vomiting  Endocrine: Negative      Genitourinary: Negative for difficulty urinating, dyspareunia, dysuria, flank pain, frequency, menstrual problem, pelvic pain, urgency, vaginal bleeding, vaginal discharge and vaginal pain  Musculoskeletal: Negative for arthralgias and myalgias  Skin: Negative for color change and pallor  Neurological: Negative for dizziness, speech difficulty, weakness and headaches  Hematological: Negative for adenopathy  Psychiatric/Behavioral: Negative for confusion  Physical Exam  Physical Exam   Constitutional: She is oriented to person, place, and time  She appears well-developed and well-nourished  She is cooperative  Non-toxic appearance  She does not have a sickly appearance  She does not appear ill  No distress  HENT:   Head: Normocephalic and atraumatic  Right Ear: Tympanic membrane and external ear normal    Left Ear: Tympanic membrane and external ear normal    Nose: No rhinorrhea, sinus tenderness or nasal deformity  No epistaxis  Right sinus exhibits no maxillary sinus tenderness and no frontal sinus tenderness  Left sinus exhibits no maxillary sinus tenderness and no frontal sinus tenderness  Mouth/Throat: Oropharynx is clear and moist and mucous membranes are normal  Normal dentition  Eyes: Pupils are equal, round, and reactive to light  EOM are normal    Neck: Normal range of motion  Neck supple  Cardiovascular: Normal rate, regular rhythm and normal heart sounds  No murmur heard  Pulmonary/Chest: Effort normal and breath sounds normal  No accessory muscle usage  No respiratory distress  She has no wheezes  She has no rales  She exhibits no tenderness  Abdominal: Soft  She exhibits no distension  There is no guarding  Musculoskeletal: Normal range of motion  She exhibits no edema or tenderness  Legs:  Lymphadenopathy:     She has no cervical adenopathy  Neurological: She is alert and oriented to person, place, and time  She exhibits normal muscle tone  Skin: Skin is warm and dry  No rash noted   No erythema  Psychiatric: She has a normal mood and affect  Nursing note and vitals reviewed  Vital Signs  ED Triage Vitals [01/31/20 2219]   Temperature Pulse Respirations Blood Pressure SpO2   97 9 °F (36 6 °C) 75 18 128/79 98 %      Temp Source Heart Rate Source Patient Position - Orthostatic VS BP Location FiO2 (%)   Oral Monitor Sitting Left arm --      Pain Score       5           Vitals:    01/31/20 2219 01/31/20 2222   BP: 128/79 116/71   Pulse: 75 92   Patient Position - Orthostatic VS: Sitting Sitting         Visual Acuity      ED Medications  Medications   naproxen (NAPROSYN) tablet 500 mg (has no administration in time range)       Diagnostic Studies  Results Reviewed     None                 No orders to display              Procedures  Procedures         ED Course                               MDM  Number of Diagnoses or Management Options  Gastrocnemius muscle strain, left, initial encounter: new and requires workup  Strain of left Achilles tendon, initial encounter: new and requires workup        Disposition  Final diagnoses:   Strain of left Achilles tendon, initial encounter   Gastrocnemius muscle strain, left, initial encounter     Time reflects when diagnosis was documented in both MDM as applicable and the Disposition within this note     Time User Action Codes Description Comment    1/31/2020 11:43 PM Mili Yehevaria Add [S86 012A] Strain of left Achilles tendon, initial encounter     1/31/2020 11:44 PM Emmett Echevaria Add [S86 112A] Gastrocnemius muscle strain, left, initial encounter       ED Disposition     ED Disposition Condition Date/Time Comment    Discharge Stable Fri Jan 31, 2020 11:43 PM Lul Parr discharge to home/self care              Follow-up Information     Follow up With Specialties Details Why [address], DO Sports Medicine Schedule an appointment as soon as possible for a visit  For Continued Evaluation [address] [facility] [address]  [facility] [address]  [phone]            Current Discharge Medication List      START taking these medications    Details   naproxen (NAPROSYN) 500 mg tablet Take 1 tablet (500 mg total) by mouth 2 (two) times a day with meals for 5 days  Qty: 10 tablet, Refills: 0    Associated Diagnoses: Strain of left Achilles tendon, initial encounter; Gastrocnemius muscle strain, left, initial encounter         CONTINUE these medications which have NOT CHANGED    Details   levonorgestrel (RANDOLPH) 13 5 MG intrauterine device 1 each by Intrauterine route      propranolol (INDERAL) 10 mg tablet Take 1 tablet (10 mg total) by mouth 3 (three) times a day  Qty: 90 tablet, Refills: 1    Associated Diagnoses: Autonomic dysfunction      sertraline (ZOLOFT) 100 mg tablet Take 100 mg by mouth daily Take 1 and 1/2 tabs daily            No discharge procedures on file      ED Provider  Electronically Signed by           ZEESHAN Oro  01/31/20 1337

## 2020-02-14 NOTE — PROGRESS NOTES
Assessment/Plan:       Diagnoses and all orders for this visit:    Encounter to establish care  -     Comprehensive metabolic panel; Future  -     Lipid panel; Future  -     TSH, 3rd generation; Future  -     T3, free; Future    POTS (postural orthostatic tachycardia syndrome)  -     Comprehensive metabolic panel; Future  -     Lipid panel; Future  -     TSH, 3rd generation; Future  -     T3, free; Future    BMI 35 0-35 9,adult  -     Comprehensive metabolic panel; Future  -     Lipid panel; Future  -     TSH, 3rd generation; Future  -     T3, free; Future    Lipid screening  -     Comprehensive metabolic panel; Future  -     Lipid panel; Future  -     TSH, 3rd generation; Future  -     T3, free; Future    Anxiety  -     sertraline (ZOLOFT) 100 mg tablet; Take 1 tablet (100 mg total) by mouth daily Take 1 and 1/2 tabs daily    Encounter for PPD test  -     TB Skin Test        No problem-specific Assessment & Plan notes found for this encounter  Subjective:      Patient ID: Mattie Gil is a 23 y o  female  Patient is here to establish care  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[employer] and 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  She has POTS, dx at age 15  She was having dizziness and lightheadedness with palpitations, upon her dx  She was then evaluated by cardiology  She is currently taking propanolol which is controlling her symptoms  Obesity- lack of exercise  Does not follow special diet      The following portions of the patient's history were reviewed and updated as appropriate:   She has a past medical history of POTS (postural orthostatic tachycardia syndrome) and POTS (postural orthostatic tachycardia syndrome)  ,  does not have any pertinent problems on file  ,   has no past surgical history on file  ,  family history is not on file  ,   reports that she has been smoking cigarettes  She has been smoking about 0 25 packs per day   She has never used smokeless tobacco  She reports that she has current or past drug history  Drug: Marijuana  She reports that she does not drink alcohol ,  has No Known Allergies     Current Outpatient Medications   Medication Sig Dispense Refill    levonorgestrel (RANDOLPH) 13 5 MG intrauterine device 1 each by Intrauterine route      propranolol (INDERAL) 10 mg tablet Take 1 tablet (10 mg total) by mouth 3 (three) times a day 90 tablet 1    sertraline (ZOLOFT) 100 mg tablet Take 1 tablet (100 mg total) by mouth daily Take 1 and 1/2 tabs daily 90 tablet 2     No current facility-administered medications for this visit  Review of Systems   Constitutional: Negative  Negative for fatigue and fever  HENT: Negative  Negative for congestion  Eyes: Negative  Negative for visual disturbance  Respiratory: Positive for shortness of breath  Negative for cough, chest tightness and wheezing  Occasional shortness of breath   Cardiovascular: Positive for palpitations  Negative for chest pain and leg swelling  Occasional palpitations   Gastrointestinal: Negative  Negative for abdominal pain, blood in stool, diarrhea and nausea  Endocrine: Negative for polydipsia, polyphagia and polyuria  Genitourinary: Negative for difficulty urinating and flank pain  Musculoskeletal: Negative  Negative for arthralgias, back pain and myalgias  Skin: Negative  Negative for color change, pallor and rash  Allergic/Immunologic: Negative for immunocompromised state  Neurological: Negative  Negative for dizziness, weakness, light-headedness, numbness and headaches  Hematological: Negative for adenopathy  Psychiatric/Behavioral: Negative  Negative for confusion, decreased concentration and sleep disturbance  All other systems reviewed and are negative          Objective:  Vitals:    02/17/20 1748   BP: 102/72   BP Location: Left arm   Patient Position: Sitting   Pulse: 90   Resp: 18   Temp: 98 2 °F (36 8 °C)   SpO2: 98%   Weight: 90 7 kg (200 lb)   Height: 5' 3" (1 6 m)     Body mass index is 35 43 kg/m²  Physical Exam   Constitutional: She is oriented to person, place, and time  She appears well-developed and well-nourished  No distress  HENT:   Head: Normocephalic and atraumatic  Right Ear: External ear normal    Left Ear: External ear normal    Nose: Nose normal    Mouth/Throat: Oropharynx is clear and moist  No oropharyngeal exudate  Eyes: Pupils are equal, round, and reactive to light  Conjunctivae are normal  No scleral icterus  Neck: Normal range of motion  Neck supple  No JVD present  No thyromegaly present  Cardiovascular: Normal rate, regular rhythm, normal heart sounds and intact distal pulses  Exam reveals no gallop and no friction rub  No murmur heard  Pulmonary/Chest: Effort normal and breath sounds normal  No respiratory distress  She exhibits no tenderness  Abdominal: Soft  Bowel sounds are normal  She exhibits no distension  There is no tenderness  Musculoskeletal: Normal range of motion  She exhibits no edema  Lymphadenopathy:     She has no cervical adenopathy  Neurological: She is alert and oriented to person, place, and time  No sensory deficit  Coordination normal    Skin: Skin is warm and dry  Capillary refill takes less than 2 seconds  No rash noted  She is not diaphoretic  Psychiatric: She has a normal mood and affect  Her behavior is normal  Judgment and thought content normal    Nursing note and vitals reviewed  BMI Counseling: Body mass index is 35 43 kg/m²  The BMI is above normal  Nutrition recommendations include reducing portion sizes, decreasing overall calorie intake, 3-5 servings of fruits/vegetables daily, reducing fast food intake, consuming healthier snacks, decreasing soda and/or juice intake, moderation in carbohydrate intake, increasing intake of lean protein, reducing intake of saturated fat and trans fat and reducing intake of cholesterol   Exercise recommendations include exercising 3-5 times per week and strength training exercises

## 2020-02-17 ENCOUNTER — OFFICE VISIT (OUTPATIENT)
Dept: FAMILY MEDICINE CLINIC | Facility: CLINIC | Age: 25
End: 2020-02-17
Payer: COMMERCIAL

## 2020-02-17 ENCOUNTER — TELEPHONE (OUTPATIENT)
Dept: ADMINISTRATIVE | Facility: OTHER | Age: 25
End: 2020-02-17

## 2020-02-17 VITALS
HEIGHT: 63 IN | SYSTOLIC BLOOD PRESSURE: 102 MMHG | HEART RATE: 90 BPM | DIASTOLIC BLOOD PRESSURE: 72 MMHG | RESPIRATION RATE: 18 BRPM | OXYGEN SATURATION: 98 % | TEMPERATURE: 98.2 F | BODY MASS INDEX: 35.44 KG/M2 | WEIGHT: 200 LBS

## 2020-02-17 DIAGNOSIS — Z76.89 ENCOUNTER TO ESTABLISH CARE: Primary | ICD-10-CM

## 2020-02-17 DIAGNOSIS — Z11.1 ENCOUNTER FOR PPD TEST: ICD-10-CM

## 2020-02-17 DIAGNOSIS — Z13.220 LIPID SCREENING: ICD-10-CM

## 2020-02-17 DIAGNOSIS — I49.8 POTS (POSTURAL ORTHOSTATIC TACHYCARDIA SYNDROME): ICD-10-CM

## 2020-02-17 DIAGNOSIS — F41.9 ANXIETY: ICD-10-CM

## 2020-02-17 PROCEDURE — 3008F BODY MASS INDEX DOCD: CPT | Performed by: NURSE PRACTITIONER

## 2020-02-17 PROCEDURE — 86580 TB INTRADERMAL TEST: CPT

## 2020-02-17 PROCEDURE — 99204 OFFICE O/P NEW MOD 45 MIN: CPT | Performed by: NURSE PRACTITIONER

## 2020-02-17 RX ORDER — SERTRALINE HYDROCHLORIDE 100 MG/1
TABLET, FILM COATED ORAL
Qty: 45 TABLET | OUTPATIENT
Start: 2020-02-17

## 2020-02-17 RX ORDER — SERTRALINE HYDROCHLORIDE 100 MG/1
100 TABLET, FILM COATED ORAL DAILY
Qty: 90 TABLET | Refills: 2 | Status: SHIPPED | OUTPATIENT
Start: 2020-02-17 | End: 2020-02-18 | Stop reason: SDUPTHER

## 2020-02-17 NOTE — TELEPHONE ENCOUNTER
Upon review of the In Basket request we were able to locate, review, and update the patient chart as requested for Pap Smear (HPV) aka Cervical Cancer Screening  Any additional questions or concerns should be emailed to the Practice Liaisons via [email or web address] email, please do not reply via In Basket      Thank you  Dl Tamayo MA

## 2020-02-17 NOTE — PATIENT INSTRUCTIONS
Establish visit    Normal PE  Obtain screening labs  POTS- stabel on current meds  Follows with Cardiology  Anxiety-stable mood  Needs refill of zoloft  Obtain labs  Our office will call with results  Return for PPD read in 2 days  Obesity   AMBULATORY CARE:   Obesity  is when your body mass index (BMI) is greater than 30  Your healthcare provider will use your height and weight to measure your BMI  The risks of obesity include  many health problems, such as injuries or physical disability  You may need tests to check for the following:  · Diabetes     · High blood pressure or high cholesterol     · Heart disease     · Gallbladder or liver disease     · Cancer of the colon, breast, prostate, liver, or kidney     · Sleep apnea     · Arthritis or gout  Seek care immediately if:   · You have a severe headache, confusion, or difficulty speaking  · You have weakness on one side of your body  · You have chest pain, sweating, or shortness of breath  Contact your healthcare provider if:   · You have symptoms of gallbladder or liver disease, such as pain in your upper abdomen  · You have knee or hip pain and discomfort while walking  · You have symptoms of diabetes, such as intense hunger and thirst, and frequent urination  · You have symptoms of sleep apnea, such as snoring or daytime sleepiness  · You have questions or concerns about your condition or care  Treatment for obesity  focuses on helping you lose weight to improve your health  Even a small decrease in BMI can reduce the risk for many health problems  Your healthcare provider will help you set a weight-loss goal   · Lifestyle changes  are the first step in treating obesity  These include making healthy food choices and getting regular physical activity  Your healthcare provider may suggest a weight-loss program that involves coaching, education, and therapy       · Medicine  may help you lose weight when it is used with a healthy diet and physical activity  · Surgery  can help you lose weight if you are very obese and have other health problems  There are several types of weight-loss surgery  Ask your healthcare provider for more information  Be successful losing weight:   · Set small, realistic goals  An example of a small goal is to walk for 20 minutes 5 days a week  Anther goal is to lose 5% of your body weight  · Tell friends, family members, and coworkers about your goals  and ask for their support  Ask a friend to lose weight with you, or join a weight-loss support group  · Identify foods or triggers that may cause you to overeat , and find ways to avoid them  Remove tempting high-calorie foods from your home and workplace  Place a bowl of fresh fruit on your kitchen counter  If stress causes you to eat, then find other ways to cope with stress  · Keep a diary to track what you eat and drink  Also write down how many minutes of physical activity you do each day  Weigh yourself once a week and record it in your diary  Eating changes: You will need to eat 500 to 1,000 fewer calories each day than you currently eat to lose 1 to 2 pounds a week  The following changes will help you cut calories:  · Eat smaller portions  Use small plates, no larger than 9 inches in diameter  Fill your plate half full of fruits and vegetables  Measure your food using measuring cups until you know what a serving size looks like  · Eat 3 meals and 1 or 2 snacks each day  Plan your meals in advance  [place] and eat at home most of the time  Eat slowly  · Eat fruits and vegetables at every meal   They are low in calories and high in fiber, which makes you feel full  Do not add butter, margarine, or cream sauce to vegetables  Use herbs to season steamed vegetables  · Eat less fat and fewer fried foods  Eat more baked or grilled chicken and fish  These protein sources are lower in calories and fat than red meat  Limit fast food   Dress your salads with olive oil and vinegar instead of bottled dressing  · Limit the amount of sugar you eat  Do not drink sugary beverages  Limit alcohol  Activity changes:  Physical activity is good for your body in many ways  It helps you burn calories and build strong muscles  It decreases stress and depression, and improves your mood  It can also help you sleep better  Talk to your healthcare provider before you begin an exercise program   · Exercise for at least 30 minutes 5 days a week  Start slowly  Set aside time each day for physical activity that you enjoy and that is convenient for you  It is best to do both weight training and an activity that increases your heart rate, such as walking, bicycling, or swimming  · Find ways to be more active  Do yard work and housecleaning  Walk up the stairs instead of using elevators  Spend your leisure time going to events that require walking, such as outdoor festivals or fairs  This extra physical activity can help you lose weight and keep it off  Follow up with your healthcare provider as directed: You may need to meet with a dietitian  Write down your questions so you remember to ask them during your visits  © 2017 [facility] [facility] Information is for End User's use only and may not be sold, redistributed or otherwise used for commercial purposes  All illustrations and images included in CareNotes® are the copyrighted property of [employer]  or Hermin[employer]  The above information is an  only  It is not intended as medical advice for individual conditions or treatments  Talk to your doctor, nurse or pharmacist before following any medical regimen to see if it is safe and effective for you  Low Fat Diet   AMBULATORY CARE:   A low-fat diet  is an eating plan that is low in total fat, unhealthy fat, and cholesterol  You may need to follow a low-fat diet if you have trouble digesting or absorbing fat   You may also need to follow this diet if you have high cholesterol  You can also lower your cholesterol by increasing the amount of fiber in your diet  Soluble fiber is a type of fiber that helps to decrease cholesterol levels  Different types of fat in food:   · Limit unhealthy fats  A diet that is high in cholesterol, saturated fat, and trans fat may cause unhealthy cholesterol levels  Unhealthy cholesterol levels increase your risk of heart disease  ¨ Cholesterol:  Limit intake of cholesterol to less than 200 mg per day  Cholesterol is found in meat, eggs, and dairy  ¨ Saturated fat:  Limit saturated fat to less than 7% of your total daily calories  Ask your dietitian how many calories you need each day  Saturated fat is found in butter, cheese, ice cream, whole milk, and palm oil  Saturated fat is also found in meat, such as beef, pork, chicken skin, and processed meats  Processed meats include sausage, hot dogs, and bologna  ¨ Trans fat:  Avoid trans fat as much as possible  Trans fat is used in fried and baked foods  Foods that say trans fat free on the label may still have up to 0 5 grams of trans fat per serving  · Include healthy fats  Replace foods that are high in saturated and trans fat with foods high in healthy fats  This may help to decrease high cholesterol levels  ¨ Monounsaturated fats: These are found in avocados, nuts, and vegetable oils, such as olive, canola, and sunflower oil  ¨ Polyunsaturated fats: These can be found in vegetable oils, such as soybean or corn oil  Omega-3 fats can help to decrease the risk of heart disease  Omega-3 fats are found in fish, such as salmon, herring, trout, and tuna  Omega-3 fats can also be found in plant foods, such as walnuts, flaxseed, soybeans, and canola oil    Foods to limit or avoid:   · Grains:      ¨ Snacks that are made with partially hydrogenated oils, such as chips, regular crackers, and butter-flavored popcorn    ¨ High-fat baked goods, such as biscuits, croissants, doughnuts, pies, cookies, and pastries    · Dairy:      ¨ Whole milk, 2% milk, and yogurt and ice cream made with whole milk    ¨ Half and half creamer, heavy cream, and whipping cream    ¨ Cheese, cream cheese, and sour cream    · Meats and proteins:      ¨ High-fat cuts of meat (T-bone steak, regular hamburger, and ribs)    ¨ Fried meat, poultry (turkey and chicken), and fish    ¨ Poultry (chicken and turkey) with skin    ¨ Cold cuts (salami or bologna), hot dogs, winters, and sausage    ¨ Whole eggs and egg yolks    · Vegetables and fruits with added fat:      ¨ Fried vegetables or vegetables in butter or high-fat sauces, such as cream or cheese sauces    ¨ Fried fruit or fruit served with butter or cream    · Fats:      ¨ Butter, stick margarine, and shortening    ¨ Coconut, palm oil, and palm kernel oil  Foods to include:   · Grains:      ¨ Whole-grain breads, cereals, pasta, and brown rice    ¨ Low-fat crackers and pretzels    · Vegetables and fruits:      ¨ Fresh, frozen, or canned vegetables (no salt or low-sodium)    ¨ Fresh, frozen, dried, or canned fruit (canned in light syrup or fruit juice)    ¨ Avocado    · Low-fat dairy products:      ¨ Nonfat (skim) or 1% milk    ¨ Nonfat or low-fat cheese, yogurt, and cottage cheese    · Meats and proteins:      ¨ Chicken or turkey with no skin    ¨ Baked or broiled fish    ¨ Lean beef and pork (loin, round, extra lean hamburger)    ¨ Beans and peas, unsalted nuts, soy products    ¨ Egg whites and substitutes    ¨ Seeds and nuts    · Fats:      ¨ Unsaturated oil, such as canola, olive, peanut, soybean, or sunflower oil    ¨ Soft or liquid margarine and vegetable oil spread    ¨ Low-fat salad dressing  Other ways to decrease fat:   · Read food labels before you buy foods  Choose foods that have less than 30% of calories from fat  Choose low-fat or fat-free dairy products  Remember that fat free does not mean calorie free   These foods still contain calories, and too many calories can lead to weight gain  · Trim fat from meat and avoid fried food  Trim all visible fat from meat before you cook it  Remove the skin from poultry  Do not slater meat, fish, or poultry  Bake, roast, boil, or broil these foods instead  Avoid fried foods  Eat a baked potato instead of Western Bobbi fries  Steam vegetables instead of sautéing them in butter  · Add less fat to foods  Use imitation winters bits on salads and baked potatoes instead of regular winters bits  Use fat-free or low-fat salad dressings instead of regular dressings  Use low-fat or nonfat butter-flavored topping instead of regular butter or margarine on popcorn and other foods  Ways to decrease fat in recipes:  Replace high-fat ingredients with low-fat or nonfat ones  This may cause baked goods to be drier than usual  You may need to use nonfat cooking spray on pans to prevent food from sticking  You also may need to change the amount of other ingredients, such as water, in the recipe  Try the following:  · Use low-fat or light margarine instead of regular margarine or shortening  · Use lean ground turkey breast or chicken, or lean ground beef (less than 5% fat) instead of hamburger  · Add 1 teaspoon of canola oil to 8 ounces of skim milk instead of using cream or half and half  · Use grated zucchini, carrots, or apples in breads instead of coconut  · Use blenderized, low-fat cottage cheese, plain tofu, or low-fat ricotta cheese instead of cream cheese  · Use 1 egg white and 1 teaspoon of canola oil, or use ¼ cup (2 ounces) of fat-free egg substitute instead of a whole egg  · Replace half of the oil that is called for in a recipe with applesauce when you bake  Use 3 tablespoons of cocoa powder and 1 tablespoon of canola oil instead of a square of baking chocolate  How to increase fiber:  Eat enough high-fiber foods to get 20 to 30 grams of fiber every day   Slowly increase your fiber intake to avoid stomach cramps, gas, and other problems  · Eat 3 ounces of whole-grain foods each day  An ounce is about 1 slice of bread  Eat whole-grain breads, such as whole-wheat bread  Whole wheat, whole-wheat flour, or other whole grains should be listed as the first ingredient on the food label  Replace white flour with whole-grain flour or use half of each in recipes  Whole-grain flour is heavier than white flour, so you may have to add more yeast or baking powder  · Eat a high-fiber cereal for breakfast   Oatmeal is a good source of soluble fiber  Look for cereals that have bran or fiber in the name  Choose whole-grain products, such as brown rice, barley, and whole-wheat pasta  · Eat more beans, peas, and lentils  For example, add beans to soups or salads  Eat at least 5 cups of fruits and vegetables each day  Eat fruits and vegetables with the peel because the peel is high in fiber  © 2017 [facility][note 3] Information is for End User's use only and may not be sold, redistributed or otherwise used for commercial purposes  All illustrations and images included in CareNotes® are the copyrighted property of [employer]  or [facility]  The above information is an  only  It is not intended as medical advice for individual conditions or treatments  Talk to your doctor, nurse or pharmacist before following any medical regimen to see if it is safe and effective for you

## 2020-02-17 NOTE — TELEPHONE ENCOUNTER
----- Message from Luis Luna sent at 2/17/2020  2:01 PM EST -----  Regarding: pap  02/17/20 2:01 PM    Hello, our patient Surendra Bernardo has had Pap Smear (HPV) aka Cervical Cancer Screening completed/performed  Please assist in updating the patient chart by pulling the document from encounter Tab within Chart Review  The date of service is 06/19/2019       Thank you,  Luis LOREDO

## 2020-02-18 ENCOUNTER — TELEPHONE (OUTPATIENT)
Dept: FAMILY MEDICINE CLINIC | Facility: CLINIC | Age: 25
End: 2020-02-18

## 2020-02-18 DIAGNOSIS — F41.9 ANXIETY: ICD-10-CM

## 2020-02-18 RX ORDER — SERTRALINE HYDROCHLORIDE 100 MG/1
TABLET, FILM COATED ORAL
Qty: 135 TABLET | Refills: 2 | Status: SHIPPED | OUTPATIENT
Start: 2020-02-18 | End: 2020-12-28

## 2020-02-18 NOTE — TELEPHONE ENCOUNTER
Pharmacy called     They need clarification on the directions for the zoloft    Please send new script

## 2020-02-19 ENCOUNTER — CLINICAL SUPPORT (OUTPATIENT)
Dept: FAMILY MEDICINE CLINIC | Facility: CLINIC | Age: 25
End: 2020-02-19

## 2020-02-19 ENCOUNTER — LAB (OUTPATIENT)
Dept: LAB | Facility: CLINIC | Age: 25
End: 2020-02-19
Payer: COMMERCIAL

## 2020-02-19 DIAGNOSIS — Z11.1 ENCOUNTER FOR PPD SKIN TEST READING: Primary | ICD-10-CM

## 2020-02-19 DIAGNOSIS — I49.8 POTS (POSTURAL ORTHOSTATIC TACHYCARDIA SYNDROME): ICD-10-CM

## 2020-02-19 DIAGNOSIS — Z76.89 ENCOUNTER TO ESTABLISH CARE: ICD-10-CM

## 2020-02-19 DIAGNOSIS — Z13.220 LIPID SCREENING: ICD-10-CM

## 2020-02-19 LAB
ALBUMIN SERPL BCP-MCNC: 3.7 G/DL (ref 3.5–5)
ALP SERPL-CCNC: 101 U/L (ref 46–116)
ALT SERPL W P-5'-P-CCNC: 18 U/L (ref 12–78)
ANION GAP SERPL CALCULATED.3IONS-SCNC: 7 MMOL/L (ref 4–13)
AST SERPL W P-5'-P-CCNC: 13 U/L (ref 5–45)
BILIRUB SERPL-MCNC: 0.32 MG/DL (ref 0.2–1)
BUN SERPL-MCNC: 11 MG/DL (ref 5–25)
CALCIUM SERPL-MCNC: 9.4 MG/DL (ref 8.3–10.1)
CHLORIDE SERPL-SCNC: 108 MMOL/L (ref 100–108)
CHOLEST SERPL-MCNC: 194 MG/DL (ref 50–200)
CO2 SERPL-SCNC: 25 MMOL/L (ref 21–32)
CREAT SERPL-MCNC: 0.66 MG/DL (ref 0.6–1.3)
GFR SERPL CREATININE-BSD FRML MDRD: 125 ML/MIN/1.73SQ M
GLUCOSE P FAST SERPL-MCNC: 101 MG/DL (ref 65–99)
HDLC SERPL-MCNC: 60 MG/DL
INDURATION: 0 MM
LDLC SERPL CALC-MCNC: 123 MG/DL (ref 0–100)
NONHDLC SERPL-MCNC: 134 MG/DL
POTASSIUM SERPL-SCNC: 4.3 MMOL/L (ref 3.5–5.3)
PROT SERPL-MCNC: 7.5 G/DL (ref 6.4–8.2)
SODIUM SERPL-SCNC: 140 MMOL/L (ref 136–145)
T3FREE SERPL-MCNC: 2.21 PG/ML (ref 2.3–4.2)
TB SKIN TEST: NEGATIVE
TRIGL SERPL-MCNC: 54 MG/DL
TSH SERPL DL<=0.05 MIU/L-ACNC: 1.46 UIU/ML (ref 0.36–3.74)

## 2020-02-19 PROCEDURE — 84481 FREE ASSAY (FT-3): CPT

## 2020-02-19 PROCEDURE — 36415 COLL VENOUS BLD VENIPUNCTURE: CPT

## 2020-02-19 PROCEDURE — 80053 COMPREHEN METABOLIC PANEL: CPT

## 2020-02-19 PROCEDURE — 80061 LIPID PANEL: CPT

## 2020-02-19 PROCEDURE — 84443 ASSAY THYROID STIM HORMONE: CPT

## 2020-02-19 PROCEDURE — RECHECK: Performed by: FAMILY MEDICINE

## 2020-02-19 NOTE — RESULT ENCOUNTER NOTE
Labs have been reviewed and are wnl except LDL or bad cholestrol has risen  Watch diet and start limited carbs and increasing green veggies and exercise  Also, TSH is normal, t3 is just a little low  I will repeat the thyroid testing at her follow up in 6 months   No need to start meds at this time

## 2020-02-20 DIAGNOSIS — R79.89 ABNORMAL THYROID BLOOD TEST: ICD-10-CM

## 2020-02-20 DIAGNOSIS — F41.9 ANXIETY: Primary | ICD-10-CM

## 2020-03-04 DIAGNOSIS — G90.9 AUTONOMIC DYSFUNCTION: ICD-10-CM

## 2020-03-04 RX ORDER — PROPRANOLOL HYDROCHLORIDE 10 MG/1
TABLET ORAL
Qty: 90 TABLET | Refills: 1 | Status: SHIPPED | OUTPATIENT
Start: 2020-03-04 | End: 2020-04-30

## 2020-04-30 DIAGNOSIS — G90.9 AUTONOMIC DYSFUNCTION: ICD-10-CM

## 2020-04-30 RX ORDER — PROPRANOLOL HYDROCHLORIDE 10 MG/1
TABLET ORAL
Qty: 90 TABLET | Refills: 1 | Status: SHIPPED | OUTPATIENT
Start: 2020-04-30 | End: 2020-07-05

## 2020-07-05 DIAGNOSIS — G90.9 AUTONOMIC DYSFUNCTION: ICD-10-CM

## 2020-07-05 RX ORDER — PROPRANOLOL HYDROCHLORIDE 10 MG/1
TABLET ORAL
Qty: 90 TABLET | Refills: 1 | Status: SHIPPED | OUTPATIENT
Start: 2020-07-05 | End: 2020-08-19 | Stop reason: SDUPTHER

## 2020-08-15 NOTE — PROGRESS NOTES
Assessment/Plan:       Diagnoses and all orders for this visit:    Anxiety    BMI 35 0-35 9,adult    Autonomic dysfunction  -     propranolol (INDERAL) 10 mg tablet; Take 1 tablet (10 mg total) by mouth 3 (three) times a day        No problem-specific Assessment & Plan notes found for this encounter  Subjective:      Patient ID: Navi Valenzuela is a 23 y o  female  Follow up    POTS dx at age 15  Anxiety-she feels as though her zoloft isnt working anymore  She has been taking this for 10 years  At times she feels that emotions are hard to handle  No suicidal thoughts  Did not do well with Lexpro in the past  Overweight-has lost 10 pounds  Was not trying  Exercise- no purposeful exercise  Diet-eats one meal a day  Does not follow a specific diet      The following portions of the patient's history were reviewed and updated as appropriate:   She has a past medical history of POTS (postural orthostatic tachycardia syndrome) and POTS (postural orthostatic tachycardia syndrome)  ,  does not have any pertinent problems on file  ,   has no past surgical history on file  ,  family history is not on file  ,   reports that she quit smoking about 5 months ago  Her smoking use included cigarettes  She smoked 0 50 packs per day  She has never used smokeless tobacco  She reports current alcohol use  She reports current drug use  Drug: Marijuana  ,  has No Known Allergies     Current Outpatient Medications   Medication Sig Dispense Refill    levonorgestrel (RANDOLPH) 13 5 MG intrauterine device 1 each by Intrauterine route      propranolol (INDERAL) 10 mg tablet Take 1 tablet (10 mg total) by mouth 3 (three) times a day 90 tablet 5    sertraline (ZOLOFT) 100 mg tablet Take 1 and 1/2 tabs daily 135 tablet 2     No current facility-administered medications for this visit  Review of Systems   Constitutional: Negative  Negative for fatigue and fever  HENT: Negative  Negative for congestion  Eyes: Negative    Negative for visual disturbance  Respiratory: Negative for cough, chest tightness, shortness of breath and wheezing  Cardiovascular: Negative  Gastrointestinal: Negative  Negative for abdominal pain, blood in stool, diarrhea and nausea  Endocrine: Negative for polydipsia, polyphagia and polyuria  Genitourinary: Negative for difficulty urinating and flank pain  Musculoskeletal: Negative  Negative for arthralgias, back pain and myalgias  Skin: Negative  Negative for color change, pallor and rash  Allergic/Immunologic: Negative for immunocompromised state  Neurological: Negative  Negative for dizziness, weakness, light-headedness, numbness and headaches  Hematological: Negative for adenopathy  Psychiatric/Behavioral: Negative  Negative for confusion, decreased concentration and sleep disturbance  All other systems reviewed and are negative  Objective:  Vitals:    08/19/20 1103   BP: 110/82   BP Location: Left arm   Patient Position: Sitting   Cuff Size: Adult   Pulse: 103   Temp: (!) 96 8 °F (36 °C)   TempSrc: Tympanic   SpO2: 98%   Weight: 86 8 kg (191 lb 4 8 oz)   Height: 5' 3 5" (1 613 m)     Body mass index is 33 36 kg/m²  Physical Exam  Vitals signs and nursing note reviewed  Constitutional:       General: She is not in acute distress  Appearance: Normal appearance  She is well-developed  She is not ill-appearing, toxic-appearing or diaphoretic  HENT:      Head: Normocephalic and atraumatic  Right Ear: Tympanic membrane, ear canal and external ear normal       Left Ear: Tympanic membrane, ear canal and external ear normal       Nose: Nose normal  No congestion or rhinorrhea  Mouth/Throat:      Mouth: Mucous membranes are moist       Pharynx: Oropharynx is clear  No oropharyngeal exudate or posterior oropharyngeal erythema  Eyes:      General: No scleral icterus       Conjunctiva/sclera: Conjunctivae normal       Pupils: Pupils are equal, round, and reactive to light    Neck:      Musculoskeletal: Normal range of motion and neck supple  Vascular: No JVD  Cardiovascular:      Rate and Rhythm: Normal rate and regular rhythm  Pulses: Normal pulses  Heart sounds: Normal heart sounds  No murmur  No friction rub  No gallop  Pulmonary:      Effort: Pulmonary effort is normal  No respiratory distress  Breath sounds: Normal breath sounds  No wheezing or rhonchi  Abdominal:      General: Bowel sounds are normal  There is no distension  Palpations: Abdomen is soft  Tenderness: There is no abdominal tenderness  Musculoskeletal: Normal range of motion  General: No swelling, tenderness or deformity  Right lower leg: No edema  Left lower leg: No edema  Lymphadenopathy:      Cervical: No cervical adenopathy  Skin:     General: Skin is warm and dry  Capillary Refill: Capillary refill takes less than 2 seconds  Coloration: Skin is not jaundiced  Findings: No bruising or rash  Neurological:      General: No focal deficit present  Mental Status: She is alert and oriented to person, place, and time  Cranial Nerves: No cranial nerve deficit  Sensory: No sensory deficit  Coordination: Coordination normal       Gait: Gait normal    Psychiatric:         Mood and Affect: Mood normal          Behavior: Behavior normal          Thought Content:  Thought content normal          Judgment: Judgment normal

## 2020-08-19 ENCOUNTER — LAB (OUTPATIENT)
Dept: LAB | Facility: CLINIC | Age: 25
End: 2020-08-19
Payer: COMMERCIAL

## 2020-08-19 ENCOUNTER — OFFICE VISIT (OUTPATIENT)
Dept: FAMILY MEDICINE CLINIC | Facility: CLINIC | Age: 25
End: 2020-08-19
Payer: COMMERCIAL

## 2020-08-19 VITALS
TEMPERATURE: 96.8 F | WEIGHT: 191.3 LBS | SYSTOLIC BLOOD PRESSURE: 110 MMHG | HEIGHT: 64 IN | DIASTOLIC BLOOD PRESSURE: 82 MMHG | BODY MASS INDEX: 32.66 KG/M2 | OXYGEN SATURATION: 98 % | HEART RATE: 103 BPM

## 2020-08-19 DIAGNOSIS — G90.9 AUTONOMIC DYSFUNCTION: ICD-10-CM

## 2020-08-19 DIAGNOSIS — R79.89 ABNORMAL THYROID BLOOD TEST: ICD-10-CM

## 2020-08-19 DIAGNOSIS — F41.9 ANXIETY: Primary | ICD-10-CM

## 2020-08-19 LAB
T3 SERPL-MCNC: 1 NG/ML (ref 0.6–1.8)
TSH SERPL DL<=0.05 MIU/L-ACNC: 1.29 UIU/ML (ref 0.36–3.74)

## 2020-08-19 PROCEDURE — 84480 ASSAY TRIIODOTHYRONINE (T3): CPT

## 2020-08-19 PROCEDURE — 84443 ASSAY THYROID STIM HORMONE: CPT

## 2020-08-19 PROCEDURE — 3008F BODY MASS INDEX DOCD: CPT | Performed by: NURSE PRACTITIONER

## 2020-08-19 PROCEDURE — 3725F SCREEN DEPRESSION PERFORMED: CPT | Performed by: NURSE PRACTITIONER

## 2020-08-19 PROCEDURE — 99214 OFFICE O/P EST MOD 30 MIN: CPT | Performed by: NURSE PRACTITIONER

## 2020-08-19 PROCEDURE — 36415 COLL VENOUS BLD VENIPUNCTURE: CPT

## 2020-08-19 PROCEDURE — 1036F TOBACCO NON-USER: CPT | Performed by: NURSE PRACTITIONER

## 2020-08-19 RX ORDER — PROPRANOLOL HYDROCHLORIDE 10 MG/1
10 TABLET ORAL 3 TIMES DAILY
Qty: 90 TABLET | Refills: 5 | Status: SHIPPED | OUTPATIENT
Start: 2020-08-19 | End: 2021-02-24

## 2020-12-26 DIAGNOSIS — F41.9 ANXIETY: ICD-10-CM

## 2020-12-28 RX ORDER — SERTRALINE HYDROCHLORIDE 100 MG/1
TABLET, FILM COATED ORAL
Qty: 135 TABLET | Refills: 2 | Status: SHIPPED | OUTPATIENT
Start: 2020-12-28 | End: 2022-01-03 | Stop reason: SDUPTHER

## 2021-02-24 DIAGNOSIS — G90.9 AUTONOMIC DYSFUNCTION: ICD-10-CM

## 2021-02-24 RX ORDER — PROPRANOLOL HYDROCHLORIDE 10 MG/1
TABLET ORAL
Qty: 90 TABLET | Refills: 0 | Status: SHIPPED | OUTPATIENT
Start: 2021-02-24 | End: 2021-03-29

## 2021-03-24 ENCOUNTER — APPOINTMENT (OUTPATIENT)
Dept: RADIOLOGY | Facility: CLINIC | Age: 26
End: 2021-03-24
Payer: COMMERCIAL

## 2021-03-24 ENCOUNTER — OFFICE VISIT (OUTPATIENT)
Dept: URGENT CARE | Facility: CLINIC | Age: 26
End: 2021-03-24
Payer: COMMERCIAL

## 2021-03-24 VITALS
RESPIRATION RATE: 18 BRPM | TEMPERATURE: 97.8 F | SYSTOLIC BLOOD PRESSURE: 140 MMHG | DIASTOLIC BLOOD PRESSURE: 60 MMHG | OXYGEN SATURATION: 98 % | HEART RATE: 88 BPM

## 2021-03-24 DIAGNOSIS — M25.531 RIGHT WRIST PAIN: Primary | ICD-10-CM

## 2021-03-24 DIAGNOSIS — M25.531 RIGHT WRIST PAIN: ICD-10-CM

## 2021-03-24 PROCEDURE — 73110 X-RAY EXAM OF WRIST: CPT

## 2021-03-24 PROCEDURE — 99213 OFFICE O/P EST LOW 20 MIN: CPT | Performed by: PHYSICIAN ASSISTANT

## 2021-03-24 NOTE — PROGRESS NOTES
[facility]        NAME: Eladio Frankel is a 27 y o  female  : 1995    MRN: [id number]  DATE: 2021  TIME: 1:12 PM    Assessment and Plan   Right wrist pain [M25 531]  1  Right wrist pain  XR wrist 3+ vw right    Ambulatory referral to Orthopedic Surgery    CANCELED: XR thumb right first digit-min 2v         Patient Instructions   Patient Instructions   Xray appears negative for any fracture  Will follow up with radiologist report when available  Recommend elevating body part, icing the area every 2 hours for 20-30 minutes, take Ibuprofen every 6-8 hours to reduce inflammation  Follow up with ortho           Follow up with PCP in 3-5 days  Proceed to  ER if symptoms worsen  Chief Complaint     Chief Complaint   Patient presents with    Wrist Pain     Pt injured wrist on 3/20 was seen in ED and placed in splint pt continues to have pain here for re evaluation         History of Present Illness       The pt is a 17-year-old female presenting with wrist pain  She fell while long boarding the other day  She fell and caught herself with the R hand  She was seen in the ED on 3/20/2021 and diagnosed with a sprain  She was told to follow up if not improved due to risk of occult fracture  She is having pain at the base of the thumb and distal ulna  She explained that she has a high pain tolerance so this is not normal for her  Review of Systems   Review of Systems   Musculoskeletal: Positive for arthralgias  Negative for gait problem, joint swelling and myalgias           Current Medications       Current Outpatient Medications:     levonorgestrel (RANDOLPH) 13 5 MG intrauterine device, 1 each by Intrauterine route, Disp: , Rfl:     propranolol (INDERAL) 10 mg tablet, take 1 tablet by mouth three times a day, Disp: 90 tablet, Rfl: 0    sertraline (ZOLOFT) 100 mg tablet, take 1 1/2 tablets by mouth daily, Disp: 135 tablet, Rfl: 2    Current Allergies     Allergies as of 2021    (No Known Allergies)            The following portions of the patient's history were reviewed and updated as appropriate: allergies, current medications, past family history, past medical history, past social history, past surgical history and problem list      Past Medical History:   Diagnosis Date    POTS (postural orthostatic tachycardia syndrome)     POTS (postural orthostatic tachycardia syndrome)        History reviewed  No pertinent surgical history  History reviewed  No pertinent family history  Medications have been verified  Objective   /60   Pulse 88   Temp 97 8 °F (36 6 °C) (Temporal)   Resp 18   SpO2 98%        Physical Exam     Physical Exam  Vitals signs reviewed  Constitutional:       General: She is not in acute distress  Appearance: Normal appearance  She is normal weight  She is not ill-appearing, toxic-appearing or diaphoretic  HENT:      Head: Normocephalic and atraumatic  Cardiovascular:      Rate and Rhythm: Normal rate and regular rhythm  Heart sounds: Normal heart sounds  No murmur  No friction rub  No gallop  Pulmonary:      Effort: Pulmonary effort is normal  No respiratory distress  Breath sounds: Normal breath sounds  No stridor  No wheezing, rhonchi or rales  Chest:      Chest wall: No tenderness  Musculoskeletal: Normal range of motion  Comments: Pain over scaphoid and distal ulna  Decreased ROM in thumb and wrist  Full ROM in elbow  No swelling     Skin:     General: Skin is warm and dry  Capillary Refill: Capillary refill takes less than 2 seconds  Neurological:      Mental Status: She is alert

## 2021-03-24 NOTE — PATIENT INSTRUCTIONS
Xray appears negative for any fracture  Will follow up with radiologist report when available  Recommend elevating body part, icing the area every 2 hours for 20-30 minutes, take Ibuprofen every 6-8 hours to reduce inflammation     Follow up with ortho

## 2021-03-28 DIAGNOSIS — G90.9 AUTONOMIC DYSFUNCTION: ICD-10-CM

## 2021-03-29 RX ORDER — PROPRANOLOL HYDROCHLORIDE 10 MG/1
TABLET ORAL
Qty: 90 TABLET | Refills: 0 | Status: SHIPPED | OUTPATIENT
Start: 2021-03-29 | End: 2021-04-27

## 2021-04-02 ENCOUNTER — TELEPHONE (OUTPATIENT)
Dept: OBGYN CLINIC | Facility: CLINIC | Age: 26
End: 2021-04-02

## 2021-04-02 NOTE — TELEPHONE ENCOUNTER
Tried to call pt and move up appt time bc the dr has to be in the [address] but her phone number was not in service I also sent her an email no response was sent back

## 2021-04-27 DIAGNOSIS — G90.9 AUTONOMIC DYSFUNCTION: ICD-10-CM

## 2021-04-27 RX ORDER — PROPRANOLOL HYDROCHLORIDE 10 MG/1
TABLET ORAL
Qty: 90 TABLET | Refills: 0 | Status: SHIPPED | OUTPATIENT
Start: 2021-04-27 | End: 2022-01-05

## 2021-04-30 ENCOUNTER — TELEPHONE (OUTPATIENT)
Dept: CARDIOLOGY CLINIC | Facility: CLINIC | Age: 26
End: 2021-04-30

## 2021-04-30 ENCOUNTER — APPOINTMENT (EMERGENCY)
Dept: RADIOLOGY | Facility: HOSPITAL | Age: 26
End: 2021-04-30
Payer: COMMERCIAL

## 2021-04-30 ENCOUNTER — HOSPITAL ENCOUNTER (EMERGENCY)
Facility: HOSPITAL | Age: 26
Discharge: HOME | End: 2021-04-30
Attending: EMERGENCY MEDICINE | Admitting: EMERGENCY MEDICINE
Payer: COMMERCIAL

## 2021-04-30 VITALS
SYSTOLIC BLOOD PRESSURE: 117 MMHG | HEART RATE: 75 BPM | OXYGEN SATURATION: 98 % | BODY MASS INDEX: 32.08 KG/M2 | TEMPERATURE: 98.7 F | WEIGHT: 184 LBS | RESPIRATION RATE: 18 BRPM | DIASTOLIC BLOOD PRESSURE: 65 MMHG

## 2021-04-30 DIAGNOSIS — R00.2 PALPITATIONS: Primary | ICD-10-CM

## 2021-04-30 LAB
ALBUMIN SERPL BCP-MCNC: 3.7 G/DL (ref 3.5–5)
ALP SERPL-CCNC: 94 U/L (ref 46–116)
ALT SERPL W P-5'-P-CCNC: 17 U/L (ref 12–78)
ANION GAP SERPL CALCULATED.3IONS-SCNC: 13 MMOL/L (ref 4–13)
AST SERPL W P-5'-P-CCNC: 15 U/L (ref 5–45)
ATRIAL RATE: 78 BPM
BASOPHILS # BLD AUTO: 0.04 THOUSANDS/ΜL (ref 0–0.1)
BASOPHILS NFR BLD AUTO: 0 % (ref 0–1)
BILIRUB SERPL-MCNC: 0.53 MG/DL (ref 0.2–1)
BILIRUB UR QL STRIP: NEGATIVE
BUN SERPL-MCNC: 7 MG/DL (ref 5–25)
CALCIUM SERPL-MCNC: 9 MG/DL (ref 8.3–10.1)
CHLORIDE SERPL-SCNC: 105 MMOL/L (ref 100–108)
CLARITY UR: CLEAR
CO2 SERPL-SCNC: 22 MMOL/L (ref 21–32)
COLOR UR: YELLOW
CREAT SERPL-MCNC: 0.7 MG/DL (ref 0.6–1.3)
EOSINOPHIL # BLD AUTO: 0.1 THOUSAND/ΜL (ref 0–0.61)
EOSINOPHIL NFR BLD AUTO: 1 % (ref 0–6)
ERYTHROCYTE [DISTWIDTH] IN BLOOD BY AUTOMATED COUNT: 12.7 % (ref 11.6–15.1)
GFR SERPL CREATININE-BSD FRML MDRD: 122 ML/MIN/1.73SQ M
GLUCOSE SERPL-MCNC: 105 MG/DL (ref 65–140)
GLUCOSE UR STRIP-MCNC: NEGATIVE MG/DL
HCG SERPL QL: NEGATIVE
HCT VFR BLD AUTO: 44.7 % (ref 34.8–46.1)
HGB BLD-MCNC: 14.7 G/DL (ref 11.5–15.4)
HGB UR QL STRIP.AUTO: NEGATIVE
IMM GRANULOCYTES # BLD AUTO: 0.03 THOUSAND/UL (ref 0–0.2)
IMM GRANULOCYTES NFR BLD AUTO: 0 % (ref 0–2)
KETONES UR STRIP-MCNC: ABNORMAL MG/DL
LEUKOCYTE ESTERASE UR QL STRIP: NEGATIVE
LYMPHOCYTES # BLD AUTO: 1.72 THOUSANDS/ΜL (ref 0.6–4.47)
LYMPHOCYTES NFR BLD AUTO: 16 % (ref 14–44)
MAGNESIUM SERPL-MCNC: 1.9 MG/DL (ref 1.6–2.6)
MCH RBC QN AUTO: 30.9 PG (ref 26.8–34.3)
MCHC RBC AUTO-ENTMCNC: 32.9 G/DL (ref 31.4–37.4)
MCV RBC AUTO: 94 FL (ref 82–98)
MONOCYTES # BLD AUTO: 0.74 THOUSAND/ΜL (ref 0.17–1.22)
MONOCYTES NFR BLD AUTO: 7 % (ref 4–12)
NEUTROPHILS # BLD AUTO: 8.49 THOUSANDS/ΜL (ref 1.85–7.62)
NEUTS SEG NFR BLD AUTO: 76 % (ref 43–75)
NITRITE UR QL STRIP: NEGATIVE
NRBC BLD AUTO-RTO: 0 /100 WBCS
P AXIS: 54 DEGREES
PH UR STRIP.AUTO: 7 [PH]
PLATELET # BLD AUTO: 276 THOUSANDS/UL (ref 149–390)
PMV BLD AUTO: 11.4 FL (ref 8.9–12.7)
POTASSIUM SERPL-SCNC: 3.3 MMOL/L (ref 3.5–5.3)
PR INTERVAL: 120 MS
PROT SERPL-MCNC: 7.5 G/DL (ref 6.4–8.2)
PROT UR STRIP-MCNC: NEGATIVE MG/DL
QRS AXIS: 55 DEGREES
QRSD INTERVAL: 78 MS
QT INTERVAL: 398 MS
QTC INTERVAL: 453 MS
RBC # BLD AUTO: 4.75 MILLION/UL (ref 3.81–5.12)
SODIUM SERPL-SCNC: 140 MMOL/L (ref 136–145)
SP GR UR STRIP.AUTO: 1.01 (ref 1–1.03)
T WAVE AXIS: 43 DEGREES
TROPONIN I SERPL-MCNC: <0.02 NG/ML
TSH SERPL DL<=0.05 MIU/L-ACNC: 2.21 UIU/ML (ref 0.36–3.74)
UROBILINOGEN UR QL STRIP.AUTO: 0.2 E.U./DL
VENTRICULAR RATE: 78 BPM
WBC # BLD AUTO: 11.12 THOUSAND/UL (ref 4.31–10.16)

## 2021-04-30 PROCEDURE — 96361 HYDRATE IV INFUSION ADD-ON: CPT

## 2021-04-30 PROCEDURE — 93005 ELECTROCARDIOGRAM TRACING: CPT

## 2021-04-30 PROCEDURE — 84703 CHORIONIC GONADOTROPIN ASSAY: CPT | Performed by: EMERGENCY MEDICINE

## 2021-04-30 PROCEDURE — 99285 EMERGENCY DEPT VISIT HI MDM: CPT

## 2021-04-30 PROCEDURE — 80053 COMPREHEN METABOLIC PANEL: CPT | Performed by: EMERGENCY MEDICINE

## 2021-04-30 PROCEDURE — 36415 COLL VENOUS BLD VENIPUNCTURE: CPT | Performed by: EMERGENCY MEDICINE

## 2021-04-30 PROCEDURE — 93010 ELECTROCARDIOGRAM REPORT: CPT | Performed by: INTERNAL MEDICINE

## 2021-04-30 PROCEDURE — 83735 ASSAY OF MAGNESIUM: CPT | Performed by: EMERGENCY MEDICINE

## 2021-04-30 PROCEDURE — 99285 EMERGENCY DEPT VISIT HI MDM: CPT | Performed by: EMERGENCY MEDICINE

## 2021-04-30 PROCEDURE — 81003 URINALYSIS AUTO W/O SCOPE: CPT | Performed by: EMERGENCY MEDICINE

## 2021-04-30 PROCEDURE — 71045 X-RAY EXAM CHEST 1 VIEW: CPT

## 2021-04-30 PROCEDURE — 85025 COMPLETE CBC W/AUTO DIFF WBC: CPT | Performed by: EMERGENCY MEDICINE

## 2021-04-30 PROCEDURE — 84484 ASSAY OF TROPONIN QUANT: CPT | Performed by: EMERGENCY MEDICINE

## 2021-04-30 PROCEDURE — 84443 ASSAY THYROID STIM HORMONE: CPT | Performed by: EMERGENCY MEDICINE

## 2021-04-30 PROCEDURE — 96374 THER/PROPH/DIAG INJ IV PUSH: CPT

## 2021-04-30 RX ORDER — SODIUM CHLORIDE 9 MG/ML
3 INJECTION INTRAVENOUS
Status: DISCONTINUED | OUTPATIENT
Start: 2021-04-30 | End: 2021-04-30 | Stop reason: HOSPADM

## 2021-04-30 RX ORDER — POTASSIUM CHLORIDE 20 MEQ/1
40 TABLET, EXTENDED RELEASE ORAL ONCE
Status: COMPLETED | OUTPATIENT
Start: 2021-04-30 | End: 2021-04-30

## 2021-04-30 RX ORDER — LORAZEPAM 2 MG/ML
0.5 INJECTION INTRAMUSCULAR ONCE
Status: COMPLETED | OUTPATIENT
Start: 2021-04-30 | End: 2021-04-30

## 2021-04-30 RX ORDER — ONDANSETRON 2 MG/ML
4 INJECTION INTRAMUSCULAR; INTRAVENOUS ONCE
Status: COMPLETED | OUTPATIENT
Start: 2021-04-30 | End: 2021-04-30

## 2021-04-30 RX ADMIN — LORAZEPAM 0.5 MG: 2 INJECTION INTRAMUSCULAR; INTRAVENOUS at 15:26

## 2021-04-30 RX ADMIN — POTASSIUM CHLORIDE 40 MEQ: 1500 TABLET, EXTENDED RELEASE ORAL at 16:11

## 2021-04-30 RX ADMIN — SODIUM CHLORIDE 1000 ML: 0.9 INJECTION, SOLUTION INTRAVENOUS at 14:25

## 2021-04-30 RX ADMIN — ONDANSETRON 4 MG: 2 INJECTION INTRAMUSCULAR; INTRAVENOUS at 14:25

## 2021-04-30 NOTE — TELEPHONE ENCOUNTER
Pt's mother called, [facility] stating that pt is having very fast HR and she is taking her to the ER  She wanted you to be aware

## 2021-04-30 NOTE — ED PROVIDER NOTES
History  Chief Complaint   Patient presents with    Palpitations     pt reports palputations fort he past couple of days was told to come here by cardiologist     28 y/o female presents to the ED for palpitations x 3 days  Patient states that she has had intermittent heart racing/ pounding palpitations x 3 days  Nothing worsens or improves them  She states that she follows with a cardiologist and was diagnosed with POTS  She states that she is currently on medication for it  She denies any recent illnesses or stimulant/ caffeine use  She denies any sob, cp, abd pain, urinary symptoms, or d/c  States that she had nausea and vomiting associated with the episodes as well as anxiety  No other complaints  History provided by:  Patient  Palpitations  Palpitations quality:  Fast  Onset quality:  Sudden  Duration:  3 days  Timing:  Intermittent  Progression:  Worsening  Chronicity:  New  Context: anxiety    Relieved by:  None tried  Worsened by:  Nothing  Ineffective treatments:  None tried  Associated symptoms: nausea and vomiting    Associated symptoms: no back pain, no chest pain, no cough, no lower extremity edema, no numbness, no shortness of breath and no weakness        Prior to Admission Medications   Prescriptions Last Dose Informant Patient Reported? Taking?   levonorgestrel (RANDOLPH) 13 5 MG intrauterine device  Self Yes No   Si each by Intrauterine route   propranolol (INDERAL) 10 mg tablet   No No   Sig: take 1 tablet by mouth three times a day   sertraline (ZOLOFT) 100 mg tablet   No No   Sig: take 1 1/2 tablets by mouth daily      Facility-Administered Medications: None       Past Medical History:   Diagnosis Date    POTS (postural orthostatic tachycardia syndrome)     POTS (postural orthostatic tachycardia syndrome)        History reviewed  No pertinent surgical history  History reviewed  No pertinent family history    I have reviewed and agree with the history as documented  E-Cigarette/Vaping    E-Cigarette Use Former User     Quit Date 3/1/20      E-Cigarette/Vaping Substances    Nicotine Yes     THC No     CBD No     Flavoring No     Other No     Unknown No      Social History     Tobacco Use    Smoking status: Former Smoker     Packs/day: 0 50     Types: Cigarettes     Quit date: 3/1/2020     Years since quittin 1    Smokeless tobacco: Never Used   Substance Use Topics    Alcohol use: Yes     Comment: socially    Drug use: Yes     Types: Marijuana     Comment: socially       Review of Systems   Constitutional: Negative for chills and fever  HENT: Negative for congestion, ear pain and sore throat  Eyes: Negative for pain and visual disturbance  Respiratory: Negative for cough, shortness of breath and wheezing  Cardiovascular: Positive for palpitations  Negative for chest pain and leg swelling  Gastrointestinal: Positive for nausea and vomiting  Negative for abdominal pain and diarrhea  Genitourinary: Negative for dysuria, frequency, hematuria and urgency  Musculoskeletal: Negative for back pain, neck pain and neck stiffness  Skin: Negative for rash and wound  Neurological: Negative for weakness, numbness and headaches  Psychiatric/Behavioral: Negative for agitation and confusion  All other systems reviewed and are negative  Physical Exam  Physical Exam  Vitals signs and nursing note reviewed  Constitutional:       Appearance: She is well-developed  HENT:      Head: Normocephalic and atraumatic  Eyes:      Pupils: Pupils are equal, round, and reactive to light  Neck:      Musculoskeletal: Normal range of motion and neck supple  Cardiovascular:      Rate and Rhythm: Normal rate and regular rhythm  Pulmonary:      Effort: Pulmonary effort is normal       Breath sounds: Normal breath sounds  Abdominal:      General: Bowel sounds are normal       Palpations: Abdomen is soft     Musculoskeletal: Normal range of motion  Skin:     General: Skin is warm and dry  Neurological:      General: No focal deficit present  Mental Status: She is alert and oriented to person, place, and time        Comments: No focal deficits         Vital Signs  ED Triage Vitals   Temperature Pulse Respirations Blood Pressure SpO2   04/30/21 1251 04/30/21 1249 04/30/21 1249 04/30/21 1249 04/30/21 1249   98 7 °F (37 1 °C) 77 (!) 24 120/87 98 %      Temp Source Heart Rate Source Patient Position - Orthostatic VS BP Location FiO2 (%)   04/30/21 1251 04/30/21 1249 04/30/21 1249 04/30/21 1249 --   Oral Monitor Sitting Left arm       Pain Score       --                  Vitals:    04/30/21 1249 04/30/21 1530 04/30/21 1600   BP: 120/87 109/59 117/65   Pulse: 77 73 75   Patient Position - Orthostatic VS: Sitting Lying Lying         Visual Acuity      ED Medications  Medications   sodium chloride (PF) 0 9 % injection 3 mL (has no administration in time range)   sodium chloride 0 9 % bolus 1,000 mL (0 mL Intravenous Stopped 4/30/21 1622)   ondansetron (ZOFRAN) injection 4 mg (4 mg Intravenous Given 4/30/21 1425)   LORazepam (ATIVAN) injection 0 5 mg (0 5 mg Intravenous Given 4/30/21 1526)   potassium chloride (K-DUR,KLOR-CON) CR tablet 40 mEq (40 mEq Oral Given 4/30/21 1611)       Diagnostic Studies  Results Reviewed     Procedure Component Value Units Date/Time    UA w Reflex to Microscopic w Reflex to Culture [[id number]]  (Abnormal) Collected: 04/30/21 1526    Lab Status: Final result Specimen: Urine, Clean Catch Updated: 04/30/21 1535     Color, UA Yellow     Clarity, UA Clear     Specific Gravity, UA 1 010     pH, UA 7 0     Leukocytes, UA Negative     Nitrite, UA Negative     Protein, UA Negative mg/dl      Glucose, UA Negative mg/dl      Ketones, UA 40 (2+) mg/dl      Urobilinogen, UA 0 2 E U /dl      Bilirubin, UA Negative     Blood, UA Negative    Troponin I [[id number]]  (Normal) Collected: 04/30/21 1426    Lab Status: Final result Specimen: Blood from Arm, Right Updated: 04/30/21 1533     Troponin I <0 02 ng/mL     hCG, qualitative pregnancy [[id number]]  (Normal) Collected: 04/30/21 1426    Lab Status: Final result Specimen: Blood from Arm, Right Updated: 04/30/21 1505     Preg, Serum Negative    TSH, 3rd generation with Free T4 reflex [[id number]]  (Normal) Collected: 04/30/21 1426    Lab Status: Final result Specimen: Blood from Arm, Right Updated: 04/30/21 1505     TSH 3RD GENERATON 2 207 uIU/mL     Narrative:      Patients undergoing fluorescein dye angiography may retain small amounts of fluorescein in the body for 48-72 hours post procedure  Samples containing fluorescein can produce falsely depressed TSH values  If the patient had this procedure,a specimen should be resubmitted post fluorescein clearance        Magnesium [[id number]]  (Normal) Collected: 04/30/21 1426    Lab Status: Final result Specimen: Blood from Arm, Right Updated: 04/30/21 1505     Magnesium 1 9 mg/dL     Comprehensive metabolic panel [[id number]]  (Abnormal) Collected: 04/30/21 1426    Lab Status: Final result Specimen: Blood from Arm, Right Updated: 04/30/21 1455     Sodium 140 mmol/L      Potassium 3 3 mmol/L      Chloride 105 mmol/L      CO2 22 mmol/L      ANION GAP 13 mmol/L      BUN 7 mg/dL      Creatinine 0 70 mg/dL      Glucose 105 mg/dL      Calcium 9 0 mg/dL      AST 15 U/L      ALT 17 U/L      Alkaline Phosphatase 94 U/L      Total Protein 7 5 g/dL      Albumin 3 7 g/dL      Total Bilirubin 0 53 mg/dL      eGFR 122 ml/min/1 73sq m     Narrative:      [facility] guidelines for Chronic Kidney Disease (CKD):     Stage 1 with normal or high GFR (GFR > 90 mL/min/1 73 square meters)    Stage 2 Mild CKD (GFR = 60-89 mL/min/1 73 square meters)    Stage 3A Moderate CKD (GFR = 45-59 mL/min/1 73 square meters)    Stage 3B Moderate CKD (GFR = 30-44 mL/min/1 73 square meters)    Stage 4 Severe CKD (GFR = 15-29 mL/min/1 73 square meters)    Stage 5 End Stage CKD (GFR <15 mL/min/1 73 square meters)  Note: GFR calculation is accurate only with a steady state creatinine    CBC and differential [[id number]]  (Abnormal) Collected: 04/30/21 1426    Lab Status: Final result Specimen: Blood from Arm, Right Updated: 04/30/21 1433     WBC 11 12 Thousand/uL      RBC 4 75 Million/uL      Hemoglobin 14 7 g/dL      Hematocrit 44 7 %      MCV 94 fL      MCH 30 9 pg      MCHC 32 9 g/dL      RDW 12 7 %      MPV 11 4 fL      Platelets 174 Thousands/uL      nRBC 0 /100 WBCs      Neutrophils Relative 76 %      Immat GRANS % 0 %      Lymphocytes Relative 16 %      Monocytes Relative 7 %      Eosinophils Relative 1 %      Basophils Relative 0 %      Neutrophils Absolute 8 49 Thousands/µL      Immature Grans Absolute 0 03 Thousand/uL      Lymphocytes Absolute 1 72 Thousands/µL      Monocytes Absolute 0 74 Thousand/µL      Eosinophils Absolute 0 10 Thousand/µL      Basophils Absolute 0 04 Thousands/µL                  X-ray chest 1 view portable   Final Result by Thalia Bernardo MD (04/30 6056)   No acute cardiopulmonary disease        Findings are stable            Workstation performed: [id number]                    Procedures  ECG 12 Lead Documentation Only    Date/Time: 4/30/2021 3:58 PM  Performed by: Patito Allen DO  Authorized by: Patito Allen DO     Indications / Diagnosis:  Palpitations   Patient location:  ED  Previous ECG:     Previous ECG:  Compared to current    Comparison ECG info:  3/2/19    Similarity:  No change  Rate:     ECG rate:  78    ECG rate assessment: normal    Rhythm:     Rhythm: sinus rhythm    Ectopy:     Ectopy: none    QRS:     QRS axis:  Normal    QRS intervals:  Normal  ST segments:     ST segments:  Normal  T waves:     T waves: normal               ED Course                             SBIRT 20yo+      Most Recent Value   SBIRT (22 yo +)   In order to provide better care to our patients, we are screening all of our patients for alcohol and drug use  Would it be okay to ask you these screening questions? Yes Filed at: 04/30/2021 1445   Initial Alcohol Screen: US AUDIT-C    1  How often do you have a drink containing alcohol?  0 Filed at: 04/30/2021 1445   2  How many drinks containing alcohol do you have on a typical day you are drinking? 0 Filed at: 04/30/2021 1445   3a  Male UNDER 65: How often do you have five or more drinks on one occasion? 0 Filed at: 04/30/2021 1445   3b  FEMALE Any Age, or MALE 65+: How often do you have 4 or more drinks on one occassion? 0 Filed at: 04/30/2021 1445   Audit-C Score  0 Filed at: 04/30/2021 1445   DANA: How many times in the past year have you    Used an illegal drug or used a prescription medication for non-medical reasons? Never Filed at: 04/30/2021 1445                    MDM  Number of Diagnoses or Management Options  Palpitations: new and requires workup  Diagnosis management comments: Patient with palpiations- will get labs, ekg, UA, TSH, and give fluids  Will reassess  Patient reevaluated and feels improved  Patient updated on results of tests  Discharge instructions given including follow-up, and return precautions  Patient demonstrates verbal understanding and agrees with plan         Amount and/or Complexity of Data Reviewed  Clinical lab tests: ordered and reviewed  Tests in the radiology section of CPT®: ordered and reviewed  Tests in the medicine section of CPT®: ordered and reviewed  Discussion of test results with the performing providers: yes  Decide to obtain previous medical records or to obtain history from someone other than the patient: yes  Obtain history from someone other than the patient: yes  Review and summarize past medical records: yes  Discuss the patient with other providers: yes  Independent visualization of images, tracings, or specimens: yes    Patient Progress  Patient progress: improved      Disposition  Final diagnoses:   Palpitations     Time reflects when diagnosis was documented in both MDM as applicable and the Disposition within this note     Time User Action Codes Description Comment    4/30/2021  4:02 PM Ananya Carter A Add [R00 2] Palpitations       ED Disposition     ED Disposition Condition Date/Time Comment    Discharge Stable Fri Apr 30, 2021  4:02 PM Jose Olivares discharge to home/self care  Follow-up Information     Follow up With Specialties Details Why Contact Info Additional [address] [facility] Emergency Medicine Go to  immediately for any new or worsening symptoms  [address] [facility] [id number] [facility], [address], [place], [address]  [facility] Call in 1 day for follow up as soon as possible [address] [phone] [address] [facility] [place], [place], [address] [phone]          Discharge Medication List as of 4/30/2021  4:13 PM      CONTINUE these medications which have NOT CHANGED    Details   levonorgestrel (RANDOLPH) 13 5 MG intrauterine device 1 each by Intrauterine route, Historical Med      propranolol (INDERAL) 10 mg tablet take 1 tablet by mouth three times a day, Normal      sertraline (ZOLOFT) 100 mg tablet take 1 1/2 tablets by mouth daily, Normal           No discharge procedures on file      PDMP Review     None          ED Provider  Electronically Signed by           Evaristo Zarate DO  04/30/21 8751

## 2021-05-05 ENCOUNTER — OFFICE VISIT (OUTPATIENT)
Dept: FAMILY MEDICINE CLINIC | Facility: CLINIC | Age: 26
End: 2021-05-05
Payer: COMMERCIAL

## 2021-05-05 VITALS
HEART RATE: 99 BPM | DIASTOLIC BLOOD PRESSURE: 68 MMHG | TEMPERATURE: 97.6 F | SYSTOLIC BLOOD PRESSURE: 120 MMHG | OXYGEN SATURATION: 94 % | HEIGHT: 64 IN | WEIGHT: 179.4 LBS | BODY MASS INDEX: 30.63 KG/M2

## 2021-05-05 DIAGNOSIS — I49.8 POTS (POSTURAL ORTHOSTATIC TACHYCARDIA SYNDROME): Primary | ICD-10-CM

## 2021-05-05 DIAGNOSIS — G47.01 INSOMNIA DUE TO MEDICAL CONDITION: ICD-10-CM

## 2021-05-05 PROBLEM — Z11.1 ENCOUNTER FOR PPD TEST: Status: RESOLVED | Noted: 2020-02-17 | Resolved: 2021-05-05

## 2021-05-05 PROCEDURE — 99213 OFFICE O/P EST LOW 20 MIN: CPT | Performed by: FAMILY MEDICINE

## 2021-05-05 NOTE — PROGRESS NOTES
Dillon Roberts 1995 female MRN: [id number]      ASSESSMENT/PLAN  Problem List Items Addressed This Visit        Cardiovascular and Mediastinum    POTS (postural orthostatic tachycardia syndrome) - Primary    Relevant Orders    Ambulatory referral to Cardiology       Other    Insomnia due to medical condition        Sleep disruption secondary to palpations  Will refer to juan f cardiologist for second opinion per pt request  Encouraged to f/u if sleep symptoms do not improve with improved control of palpitations  Future Appointments   Date Time Provider Susana White   5/17/2021  4:30 PM Leeann Rolle MD [facility]          SUBJECTIVE  CC: Follow-up (trouble sleeping- want a new referral to a [facility] cardiologist)      HPI:  Dillon Roberts is a 27 y o  female who presents due to insomnia  Diagnose with POTS as a teenager  For the past week or so, has been having more palpitations  It wakes her up in the middle of the night -- every 3 hours or so -- and is tired at the time  Has been seeing a Cardiologist and is currently on Propranolol TID -- has been skipping the middle dose, and does feel like it is helping somewhat  Would like a second opinion  Review of Systems   Cardiovascular: Positive for palpitations  Psychiatric/Behavioral: Positive for sleep disturbance  Historical Information   The patient history was reviewed and updated as follows:    Past Medical History:   Diagnosis Date    Closed fracture of radius and ulna 5/8/2006    Concussion 9/21/2015    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    POTS (postural orthostatic tachycardia syndrome)     POTS (postural orthostatic tachycardia syndrome)      No past surgical history on file  No family history on file     Social History   Social History     Substance and Sexual Activity   Alcohol Use Yes    Comment: socially     Social History     Substance and Sexual Activity   Drug Use Yes    Types: Marijuana Comment: socially     Social History     Tobacco Use   Smoking Status Former Smoker    Packs/day: 0 50    Types: Cigarettes    Quit date: 3/1/2020    Years since quittin 1   Smokeless Tobacco Never Used       Medications:     Current Outpatient Medications:     levonorgestrel (RANDOLPH) 13 5 MG intrauterine device, 1 each by Intrauterine route, Disp: , Rfl:     propranolol (INDERAL) 10 mg tablet, take 1 tablet by mouth three times a day, Disp: 90 tablet, Rfl: 0    sertraline (ZOLOFT) 100 mg tablet, take 1 1/2 tablets by mouth daily, Disp: 135 tablet, Rfl: 2  No Known Allergies    OBJECTIVE    Vitals:   Vitals:    21 1102   BP: 120/68   Pulse: 99   Temp: 97 6 °F (36 4 °C)   TempSrc: Temporal   SpO2: 94%   Weight: 81 4 kg (179 lb 6 4 oz)   Height: 5' 3 5" (1 613 m)           Physical Exam  Vitals signs and nursing note reviewed  Constitutional:       General: She is not in acute distress  Appearance: Normal appearance  HENT:      Head: Normocephalic and atraumatic  Pulmonary:      Effort: Pulmonary effort is normal  No respiratory distress  Neurological:      General: No focal deficit present  Mental Status: She is alert  Psychiatric:         Mood and Affect: Mood normal                     DO Blu Guill[address] [facility]   2021  11:16 AM      BMI Counseling: Body mass index is 31 28 kg/m²  The BMI is above normal  Nutrition recommendations include reducing portion sizes and decreasing overall calorie intake  Exercise recommendations include exercising 3-5 times per week

## 2021-05-17 ENCOUNTER — OFFICE VISIT (OUTPATIENT)
Dept: CARDIOLOGY CLINIC | Facility: CLINIC | Age: 26
End: 2021-05-17
Payer: COMMERCIAL

## 2021-05-17 VITALS
HEIGHT: 63 IN | DIASTOLIC BLOOD PRESSURE: 64 MMHG | WEIGHT: 180 LBS | HEART RATE: 63 BPM | SYSTOLIC BLOOD PRESSURE: 102 MMHG | BODY MASS INDEX: 31.89 KG/M2

## 2021-05-17 DIAGNOSIS — G90.9 AUTONOMIC DYSFUNCTION: Primary | ICD-10-CM

## 2021-05-17 PROCEDURE — 1036F TOBACCO NON-USER: CPT | Performed by: INTERNAL MEDICINE

## 2021-05-17 PROCEDURE — 99213 OFFICE O/P EST LOW 20 MIN: CPT | Performed by: INTERNAL MEDICINE

## 2021-05-17 PROCEDURE — 3008F BODY MASS INDEX DOCD: CPT | Performed by: INTERNAL MEDICINE

## 2021-05-17 PROCEDURE — 93000 ELECTROCARDIOGRAM COMPLETE: CPT | Performed by: INTERNAL MEDICINE

## 2021-05-17 NOTE — PROGRESS NOTES
Cardiology Follow Up    Pura Cadena  1995  [address] [facility] [place] [address]  [phone]    1  Autonomic dysfunction  POCT ECG   2   BMI 35 0-35 9,adult         Interval History:   Patient did well when she was undergoing Earle protocol  She completed 8 months of therapy   However she has discontinued all exercise since then  She has some autonomic symptoms-on till study this is suggestive of delayed orthostatic hypotension      Original history  The patient has been referred to me by Dr Melab Wilkinson for evaluation of autonomic dysfunction and POTS      The patient carries this diagnosis for over 5 years  However there is no tilt study, holter when the diagnosis was originally made     She does give a history of lightheadedness and occasional headache  There is also history of short-term memory loss after concussion  She does not have a history of chest pain  There is occasional palpitation  There is no history of early satiety or bloating     There has been a gradual weight gain  This has been worsened when she was on  fludrocortisone according to her     She works as a   She is getting tired more often when she needs to stand for long periods of time     Most importantly there are episodes of palpitation which would come suddenly  This can happen when she is sleeping at night  This can also happen when she is sitting and watching movies  According to her and her mother suddenly are heart rate will be in the 140s     The patient is not complaining of anginal like chest pain or chest pressure  There is no worsening orthopnea, paroxysmal nocturnal dyspnea  There is no leg swelling     Patient does get palpitation   There is no history of presyncope or syncope  There is rare history of transient  lightheadedness  When patient has these palpitations, it is associated with exertional intolerance     There is history of snoring at night  There is history of morning fatigability  There is occasional history of daytime sleepiness     Patient was smoking half a pack a day, quit in 2020   No significant alcohol abuse  No recreational drug use  no energy drink use    /64 (BP Location: Left arm, Patient Position: Sitting, Cuff Size: Standard)   Pulse 63   Ht 5' 3" (1 6 m)   Wt 81 6 kg (180 lb)   BMI 31 89 kg/m²       Patient Active Problem List   Diagnosis    POTS (postural orthostatic tachycardia syndrome)    BMI 35 0-35 9,adult    Anxiety    Allergic rhinitis    Insomnia due to medical condition     Past Medical History:   Diagnosis Date    Closed fracture of radius and ulna 2006    Concussion 2015    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    POTS (postural orthostatic tachycardia syndrome)     POTS (postural orthostatic tachycardia syndrome)      Social History     Socioeconomic History    Marital status: Single     Spouse name: Not on file    Number of children: Not on file    Years of education: Not on file    Highest education level: Not on file   Occupational History    Not on file   Social Needs    Financial resource strain: Not on file    Food insecurity     Worry: Not on file     Inability: Not on file    Transportation needs     Medical: Not on file     Non-medical: Not on file   Tobacco Use    Smoking status: Former Smoker     Packs/day: 0 50     Types: Cigarettes     Quit date: 3/1/2020     Years since quittin 2    Smokeless tobacco: Never Used   Substance and Sexual Activity    Alcohol use: Yes     Comment: socially    Drug use: Yes     Types: Marijuana     Comment: socially    Sexual activity: Yes   Lifestyle    Physical activity     Days per week: Not on file     Minutes per session: Not on file    Stress: Not on file   Relationships    Social connections     Talks on phone: Not on file     Gets together: Not on file     Attends [employer] service: Not on file     Active member of club or organization: Not on file     Attends meetings of clubs or organizations: Not on file     Relationship status: Not on file    Intimate partner violence     Fear of current or ex partner: Not on file     Emotionally abused: Not on file     Physically abused: Not on file     Forced sexual activity: Not on file   Other Topics Concern    Not on file   Social History Narrative    Not on file      History reviewed  No pertinent family history  History reviewed  No pertinent surgical history  Current Outpatient Medications:     levonorgestrel (RANDOLPH) 13 5 MG intrauterine device, 1 each by Intrauterine route, Disp: , Rfl:     propranolol (INDERAL) 10 mg tablet, take 1 tablet by mouth three times a day, Disp: 90 tablet, Rfl: 0    sertraline (ZOLOFT) 100 mg tablet, take 1 1/2 tablets by mouth daily (Patient taking differently: 100 mg take 1 and 1/2 tablets by mouth daily     Patient is only taking 1 tablet daily ), Disp: 135 tablet, Rfl: 2  No Known Allergies    Labs:  Lab Results   Component Value Date    K 3 3 (L) 04/30/2021     04/30/2021    CO2 22 04/30/2021    BUN 7 04/30/2021    CREATININE 0 70 04/30/2021    CALCIUM 9 0 04/30/2021     Lab Results   Component Value Date    TROPONINI <0 02 04/30/2021     Lab Results   Component Value Date    WBC 11 12 (H) 04/30/2021    HGB 14 7 04/30/2021    HCT 44 7 04/30/2021    MCV 94 04/30/2021     04/30/2021     Lab Results   Component Value Date    TRIG 54 02/19/2020    HDL 60 02/19/2020     Imaging:       Holter   April 2019  Circadian rhythm is retained              Tilt table study   March 2019  Details of the study  Delayed orthostatic hypotension          Interpretation  Patient is on Florinef, Zoloft, propranolol 10 mg TID     No bradycardic response   No increase in HR by >30 OR >120/min in 10 min     BP does fall from 117/78  when supine to 106/48 at 15 min and 99/62  at 25 min  DBP fall >10 mmHg     This is suggestive of delayed orthostatic hypotension        Recommendations:  1  Water intake   2  Compression stockings  3  Exercise- [facility]le Protocol  4  Beta blocker- Propranolol gradual taper once in [facility] protocol  5  Other medications - Fludrocortisone to taper once in [place] protocol               Echo   March 2019  LEFT VENTRICLE:  Systolic function was normal  Ejection fraction was estimated to be 60 %  There were no regional wall motion abnormalities      TRICUSPID VALVE:  There was trace regurgitation  Pulmonary artery systolic pressure was within the normal range      PULMONIC VALVE:  There was trace regurgitation  X-ray Chest 1 View Portable  Result Date: 4/30/2021  Narrative: CHEST INDICATION:   chest pain  COMPARISON:  3/2/2019 EXAM PERFORMED/VIEWS:  XR CHEST PORTABLE Single view FINDINGS: Cardiomediastinal silhouette appears unremarkable  The lungs are clear  No pneumothorax or pleural effusion  Osseous structures appear within normal limits for patient age  Impression: No acute cardiopulmonary disease  Findings are stable Workstation performed: [id number]       Review of Systems:  Review of Systems   All other systems reviewed and are negative  as described in my history of present illness        Physical Exam:  Physical Exam  Vitals signs reviewed  Constitutional:       Appearance: Normal appearance  She is well-developed  She is obese  She is not ill-appearing  Comments: Not in any distress at the current time   HENT:      Head: Normocephalic and atraumatic  Right Ear: External ear normal       Left Ear: External ear normal       Nose: Nose normal  No congestion or rhinorrhea  Mouth/Throat:      Pharynx: Uvula midline  No oropharyngeal exudate or posterior oropharyngeal erythema  Eyes:      General: Lids are normal  No scleral icterus  Extraocular Movements: Extraocular movements intact        Conjunctiva/sclera: Conjunctivae normal       Pupils: Pupils are equal, round, and reactive to light  Comments: No pallor  No cyanosis  No icterus   Neck:      Musculoskeletal: Normal range of motion and neck supple  No neck rigidity or muscular tenderness  Thyroid: No thyromegaly  Vascular: No carotid bruit or JVD  Trachea: Trachea normal       Comments: No jugular lymphadenopathy  Cardiovascular:      Rate and Rhythm: Normal rate and regular rhythm  Chest Wall: PMI is not displaced  Pulses: Normal pulses  Heart sounds: Normal heart sounds, S1 normal and S2 normal  No murmur  No friction rub  No gallop  No S3 or S4 sounds  Pulmonary:      Effort: Pulmonary effort is normal  No accessory muscle usage or respiratory distress  Breath sounds: Normal breath sounds  No decreased breath sounds, wheezing, rhonchi or rales  Chest:      Chest wall: No tenderness  Abdominal:      General: Abdomen is flat  Bowel sounds are normal  There is no distension  Palpations: Abdomen is soft  There is no hepatomegaly, splenomegaly or mass  Tenderness: There is no abdominal tenderness  Musculoskeletal: Normal range of motion  General: No swelling, tenderness or deformity  Lymphadenopathy:      Cervical: No cervical adenopathy  Skin:     Coloration: Skin is not jaundiced or pale  Findings: No abrasion, bruising, erythema, lesion or rash  Nails: There is no clubbing  Neurological:      Mental Status: She is alert and oriented to person, place, and time  Mental status is at baseline  Cranial Nerves: No cranial nerve deficit  Motor: No weakness  Comments: Facial symmetry is retained  Extraocular movements are retained  Head neck tongue and palate movement are retained and symmetric   Psychiatric:         Mood and Affect: Mood normal          Speech: Speech normal          Behavior: Behavior normal          Thought Content:  Thought content normal  Discussion/Summary:  1  History of palpitation, autonomic dysfunction  Patient carries a diagnosis of POTS    At the current time tilt-table study is suggestive of delayed orthostatic hypotension        Therapeutic interventions  - keep water intake between 2 5-3 L; can use half as Gatorade or Powerade  At the current time will avoid using extra salt     -use of beta-blockers  Start propranolol 10 mg 3 times daily; If symptomatic really doing well can reduce it to 5 mg 3 times a day by a gradual tapering method         - Pressure stockings  Bilateral  Knee high, thigh high or waist high  10 mm, 20 mm, 30 mm, 40 mm Hg  Helps with venous return        - Exercise -  [facility]le protocol  Patient had good response to the [facility] protocol  However she discontinued it after she completed the 8 months protocol   Advised to remain physically active-at the level of 8 months 4 weeks in the [facility] protocol        - Medications to avoid  Numerous anti depressants and ADHD medications that inhibit NE transporter  TCA  SNRI - duloxetine, venlafaxine, milnacipram  NRI - atomoxetine, reboxetine  These can provoke POTS in susceptible healthy volunteers                 2  Palpitation suggestive of SVT  This is the patient's 2nd type of palpitation  This can happen even when she is sitting or laying in bed  This is sudden in onset     This has never been evaluated  Would recommend a 30 day event monitor - this has been frequent enough especially when beta-blockers are being reduced  In addition can look at an exercise stress test to see if any arrhythmias induced  Her mother questions about WPW syndrome-The EKG does not show any evidence of preexcitation              3   Obesity  Patient's BMI is 31 9  She will definitely benefit from organized exercise regimen              Summary of my recommendations at the current time:  [facility] protocol  Stay hydrated

## 2021-05-17 NOTE — LETTER
May 23, 2021     Alison Bermudez DO  [address]  [address] [facility] [address]  [place] [address]    Patient: Sonja Bonner   YOB: 1995   Date of Visit: 5/17/2021       Dear Dr Fatou Wallis: Thank you for referring Filipe Yarbrough to me for evaluation  Below are my notes for this consultation  If you have questions, please do not hesitate to call me  I look forward to following your patient along with you  Sincerely,        Aelxi Ivy MD        CC: Tiara Valencia MD  5/23/2021  7:02 PM  Sign when Signing Visit                                             Cardiology Follow Up    Sonja Bonner  1995  [address] [address]    1  Autonomic dysfunction  POCT ECG   2   BMI 35 0-35 9,adult         Interval History:   Patient did well when she was undergoing [facility] protocol  She completed 8 months of therapy   However she has discontinued all exercise since then  She has some autonomic symptoms-on till study this is suggestive of delayed orthostatic hypotension      Original history  The patient has been referred to me by Dr Ben Arnett for evaluation of autonomic dysfunction and POTS      The patient carries this diagnosis for over 5 years  However there is no tilt study, holter when the diagnosis was originally made     She does give a history of lightheadedness and occasional headache  There is also history of short-term memory loss after concussion  She does not have a history of chest pain  There is occasional palpitation  There is no history of early satiety or bloating     There has been a gradual weight gain  This has been worsened when she was on  fludrocortisone according to her     She works as a   She is getting tired more often when she needs to stand for long periods of time     Most importantly there are episodes of palpitation which would come suddenly  This can happen when she is sleeping at night  This can also happen when she is sitting and watching movies  According to her and her mother suddenly are heart rate will be in the 140s     The patient is not complaining of anginal like chest pain or chest pressure  There is no worsening orthopnea, paroxysmal nocturnal dyspnea  There is no leg swelling     Patient does get palpitation   There is no history of presyncope or syncope  There is rare history of transient  lightheadedness  When patient has these palpitations, it is associated with exertional intolerance     There is history of snoring at night  There is history of morning fatigability  There is occasional history of daytime sleepiness     Patient was smoking half a pack a day, quit in March 2020   No significant alcohol abuse  No recreational drug use  no energy drink use    /64 (BP Location: Left arm, Patient Position: Sitting, Cuff Size: Standard)   Pulse 63   Ht 5' 3" (1 6 m)   Wt 81 6 kg (180 lb)   BMI 31 89 kg/m²       Patient Active Problem List   Diagnosis    POTS (postural orthostatic tachycardia syndrome)    BMI 35 0-35 9,adult    Anxiety    Allergic rhinitis    Insomnia due to medical condition     Past Medical History:   Diagnosis Date    Closed fracture of radius and ulna 5/8/2006    Concussion 9/21/2015    Late effect of intracranial injury ([facility] [address] ) 9/21/2015    POTS (postural orthostatic tachycardia syndrome)     POTS (postural orthostatic tachycardia syndrome)      Social History     Socioeconomic History    Marital status: Single     Spouse name: Not on file    Number of children: Not on file    Years of education: Not on file    Highest education level: Not on file   Occupational History    Not on file   Social Needs    Financial resource strain: Not on file    Food insecurity     Worry: Not on file     Inability: Not on file    Transportation needs     Medical: Not on file     Non-medical: Not on file   Tobacco Use    Smoking status: Former Smoker     Packs/day: 0 50     Types: Cigarettes     Quit date: 3/1/2020     Years since quittin 2    Smokeless tobacco: Never Used   Substance and Sexual Activity    Alcohol use: Yes     Comment: socially    Drug use: Yes     Types: Marijuana     Comment: socially    Sexual activity: Yes   Lifestyle    Physical activity     Days per week: Not on file     Minutes per session: Not on file    Stress: Not on file   Relationships    Social connections     Talks on phone: Not on file     Gets together: Not on file     Attends Jainism service: Not on file     Active member of club or organization: Not on file     Attends meetings of clubs or organizations: Not on file     Relationship status: Not on file    Intimate partner violence     Fear of current or ex partner: Not on file     Emotionally abused: Not on file     Physically abused: Not on file     Forced sexual activity: Not on file   Other Topics Concern    Not on file   Social History Narrative    Not on file      History reviewed  No pertinent family history  History reviewed  No pertinent surgical history  Current Outpatient Medications:     levonorgestrel (RANDOLPH) 13 5 MG intrauterine device, 1 each by Intrauterine route, Disp: , Rfl:     propranolol (INDERAL) 10 mg tablet, take 1 tablet by mouth three times a day, Disp: 90 tablet, Rfl: 0    sertraline (ZOLOFT) 100 mg tablet, take 1 1/2 tablets by mouth daily (Patient taking differently: 100 mg take 1 and 1/2 tablets by mouth daily     Patient is only taking 1 tablet daily ), Disp: 135 tablet, Rfl: 2  No Known Allergies    Labs:  Lab Results   Component Value Date    K 3 3 (L) 2021     2021    CO2 22 2021    BUN 7 2021    CREATININE 0 70 2021    CALCIUM 9 0 2021     Lab Results   Component Value Date    TROPONINI <0 02 2021     Lab Results   Component Value Date    WBC 11 12 (H) 2021    HGB 14 7 2021    HCT 44 7 04/30/2021    MCV 94 04/30/2021     04/30/2021     Lab Results   Component Value Date    TRIG 54 02/19/2020    HDL 60 02/19/2020     Imaging:       Holter   April 2019  Circadian rhythm is retained              Tilt table study   March 2019  Details of the study  Delayed orthostatic hypotension          Interpretation  Patient is on Florinef, Zoloft, propranolol 10 mg TID     No bradycardic response   No increase in HR by >30 OR >120/min in 10 min     BP does fall from 117/78  when supine to 106/48 at 15 min and 99/62  at 25 min  DBP fall >10 mmHg     This is suggestive of delayed orthostatic hypotension        Recommendations:  1  Water intake   2  Compression stockings  3  Exercise- [facility]le Protocol  4  Beta blocker- Propranolol gradual taper once in [place] protocol  5  Other medications - Fludrocortisone to taper once in [facility] protocol               Echo   March 2019  LEFT VENTRICLE:  Systolic function was normal  Ejection fraction was estimated to be 60 %  There were no regional wall motion abnormalities      TRICUSPID VALVE:  There was trace regurgitation  Pulmonary artery systolic pressure was within the normal range      PULMONIC VALVE:  There was trace regurgitation  X-ray Chest 1 View Portable  Result Date: 4/30/2021  Narrative: CHEST INDICATION:   chest pain  COMPARISON:  3/2/2019 EXAM PERFORMED/VIEWS:  XR CHEST PORTABLE Single view FINDINGS: Cardiomediastinal silhouette appears unremarkable  The lungs are clear  No pneumothorax or pleural effusion  Osseous structures appear within normal limits for patient age  Impression: No acute cardiopulmonary disease  Findings are stable Workstation performed: [id number]       Review of Systems:  Review of Systems   All other systems reviewed and are negative  as described in my history of present illness        Physical Exam:  Physical Exam  Vitals signs reviewed  Constitutional:       Appearance: Normal appearance   She is well-developed  She is obese  She is not ill-appearing  Comments: Not in any distress at the current time   HENT:      Head: Normocephalic and atraumatic  Right Ear: External ear normal       Left Ear: External ear normal       Nose: Nose normal  No congestion or rhinorrhea  Mouth/Throat:      Pharynx: Uvula midline  No oropharyngeal exudate or posterior oropharyngeal erythema  Eyes:      General: Lids are normal  No scleral icterus  Extraocular Movements: Extraocular movements intact  Conjunctiva/sclera: Conjunctivae normal       Pupils: Pupils are equal, round, and reactive to light  Comments: No pallor  No cyanosis  No icterus   Neck:      Musculoskeletal: Normal range of motion and neck supple  No neck rigidity or muscular tenderness  Thyroid: No thyromegaly  Vascular: No carotid bruit or JVD  Trachea: Trachea normal       Comments: No jugular lymphadenopathy  Cardiovascular:      Rate and Rhythm: Normal rate and regular rhythm  Chest Wall: PMI is not displaced  Pulses: Normal pulses  Heart sounds: Normal heart sounds, S1 normal and S2 normal  No murmur  No friction rub  No gallop  No S3 or S4 sounds  Pulmonary:      Effort: Pulmonary effort is normal  No accessory muscle usage or respiratory distress  Breath sounds: Normal breath sounds  No decreased breath sounds, wheezing, rhonchi or rales  Chest:      Chest wall: No tenderness  Abdominal:      General: Abdomen is flat  Bowel sounds are normal  There is no distension  Palpations: Abdomen is soft  There is no hepatomegaly, splenomegaly or mass  Tenderness: There is no abdominal tenderness  Musculoskeletal: Normal range of motion  General: No swelling, tenderness or deformity  Lymphadenopathy:      Cervical: No cervical adenopathy  Skin:     Coloration: Skin is not jaundiced or pale  Findings: No abrasion, bruising, erythema, lesion or rash  Nails:  There is no clubbing  Neurological:      Mental Status: She is alert and oriented to person, place, and time  Mental status is at baseline  Cranial Nerves: No cranial nerve deficit  Motor: No weakness  Comments: Facial symmetry is retained  Extraocular movements are retained  Head neck tongue and palate movement are retained and symmetric   Psychiatric:         Mood and Affect: Mood normal          Speech: Speech normal          Behavior: Behavior normal          Thought Content: Thought content normal          Discussion/Summary:  1  History of palpitation, autonomic dysfunction  Patient carries a diagnosis of POTS    At the current time tilt-table study is suggestive of delayed orthostatic hypotension        Therapeutic interventions  - keep water intake between 2 5-3 L; can use half as Gatorade or Powerade  At the current time will avoid using extra salt     -use of beta-blockers  Start propranolol 10 mg 3 times daily; If symptomatic really doing well can reduce it to 5 mg 3 times a day by a gradual tapering method         - Pressure stockings  Bilateral  Knee high, thigh high or waist high  10 mm, 20 mm, 30 mm, 40 mm Hg  Helps with venous return        - Exercise -  [facility] protocol  Patient had good response to the [facility] protocol  However she discontinued it after she completed the 8 months protocol   Advised to remain physically active-at the level of 8 months 4 weeks in the [place] protocol        - Medications to avoid  Numerous anti depressants and ADHD medications that inhibit NE transporter  TCA  SNRI - duloxetine, venlafaxine, milnacipram  NRI - atomoxetine, reboxetine  These can provoke POTS in susceptible healthy volunteers                 2   Palpitation suggestive of SVT  This is the patient's 2nd type of palpitation  This can happen even when she is sitting or laying in bed  This is sudden in onset     This has never been evaluated  Would recommend a 30 day event monitor - this has been frequent enough especially when beta-blockers are being reduced  In addition can look at an exercise stress test to see if any arrhythmias induced  Her mother questions about WPW syndrome-The EKG does not show any evidence of preexcitation              3   Obesity  Patient's BMI is 31 9  She will definitely benefit from organized exercise regimen              Summary of my recommendations at the current time:  [facility] protocol  Stay hydrated

## 2021-05-23 PROBLEM — G90.9 AUTONOMIC DYSFUNCTION: Status: ACTIVE | Noted: 2021-05-23

## 2021-09-08 ENCOUNTER — APPOINTMENT (OUTPATIENT)
Dept: RADIOLOGY | Facility: CLINIC | Age: 26
End: 2021-09-08
Payer: COMMERCIAL

## 2021-09-08 ENCOUNTER — OFFICE VISIT (OUTPATIENT)
Dept: URGENT CARE | Facility: CLINIC | Age: 26
End: 2021-09-08
Payer: COMMERCIAL

## 2021-09-08 VITALS
DIASTOLIC BLOOD PRESSURE: 70 MMHG | BODY MASS INDEX: 31.54 KG/M2 | WEIGHT: 178 LBS | RESPIRATION RATE: 16 BRPM | OXYGEN SATURATION: 99 % | TEMPERATURE: 98.3 F | HEIGHT: 63 IN | SYSTOLIC BLOOD PRESSURE: 112 MMHG | HEART RATE: 83 BPM

## 2021-09-08 DIAGNOSIS — S93.402A SPRAIN OF LEFT ANKLE, UNSPECIFIED LIGAMENT, INITIAL ENCOUNTER: ICD-10-CM

## 2021-09-08 DIAGNOSIS — S93.402A SPRAIN OF LEFT ANKLE, UNSPECIFIED LIGAMENT, INITIAL ENCOUNTER: Primary | ICD-10-CM

## 2021-09-08 PROCEDURE — 99213 OFFICE O/P EST LOW 20 MIN: CPT | Performed by: PHYSICIAN ASSISTANT

## 2021-09-08 PROCEDURE — 73610 X-RAY EXAM OF ANKLE: CPT

## 2021-09-08 NOTE — PROGRESS NOTES
[facility]        NAME: Bennett Alberto is a 27 y o  female  : 1995    MRN: [id number]  DATE: 2021  TIME: 2:55 PM    Assessment and Plan   Sprain of left ankle, unspecified ligament, initial encounter [S93 [id number]]  1  Sprain of left ankle, unspecified ligament, initial encounter  XR ankle 3+ vw left         Patient Instructions     Patient Instructions     Ankle brace  Rest, ice and elevation  Tylenol and motrin  pcp follow up or return to clinic with new or worsening symptoms  Ankle Sprain   WHAT YOU NEED TO KNOW:   An ankle sprain happens when 1 or more ligaments in your ankle joint stretch or tear  Ligaments are tough tissues that connect bones  Ligaments support your joints and keep your bones in place  DISCHARGE INSTRUCTIONS:   Return to the emergency department if:   · You have severe pain in your ankle  · Your foot or toes are cold or numb  · Your ankle becomes more weak or unstable (wobbly)  · You are unable to put any weight on your ankle or foot  · Your swelling has increased or returned  Call your doctor if:   · Your pain does not go away, even after treatment  · You have questions or concerns about your condition or care  Medicines: You may need any of the following:  · NSAIDs , such as ibuprofen, help decrease swelling, pain, and fever  This medicine is available with or without a doctor's order  NSAIDs can cause stomach bleeding or kidney problems in certain people  If you take blood thinner medicine, always ask your healthcare provider if NSAIDs are safe for you  Always read the medicine label and follow directions  · Acetaminophen  decreases pain and fever  It is available without a doctor's order  Ask how much to take and how often to take it  Follow directions   Read the labels of all other medicines you are using to see if they also contain acetaminophen, or ask your doctor or pharmacist  Acetaminophen can cause liver damage if not taken correctly  Do not use more than 4 grams (4,000 milligrams) total of acetaminophen in one day  · Prescription pain medicine  may be given  Ask your healthcare provider how to take this medicine safely  Some prescription pain medicines contain acetaminophen  Do not take other medicines that contain acetaminophen without talking to your healthcare provider  Too much acetaminophen may cause liver damage  Prescription pain medicine may cause constipation  Ask your healthcare provider how to prevent or treat constipation  · Take your medicine as directed  Contact your healthcare provider if you think your medicine is not helping or if you have side effects  Tell him or her if you are allergic to any medicine  Keep a list of the medicines, vitamins, and herbs you take  Include the amounts, and when and why you take them  Bring the list or the pill bottles to follow-up visits  Carry your medicine list with you in case of an emergency  Self-care:   · Use support devices,  such as a brace, cast, or splint, to limit your movement and protect your joint  You may need to use crutches to decrease your pain as you move around  · Go to physical therapy as directed  A physical therapist teaches you exercises to help improve movement and strength, and to decrease pain  · Rest  your ankle so that it can heal  Return to normal activities as directed  · Apply ice  on your ankle for 15 to 20 minutes every hour or as directed  Use an ice pack, or put crushed ice in a plastic bag  Cover it with a towel  Ice helps prevent tissue damage and decreases swelling and pain  · Compress  your ankle  Ask if you should wrap an elastic bandage around your injured ligament  An elastic bandage provides support and helps decrease swelling and movement so your joint can heal  Wear as long as directed  · Elevate  your ankle above the level of your heart as often as you can  This will help decrease swelling and pain   Prop your ankle on pillows or blankets to keep it elevated comfortably  Prevent another ankle sprain:   · Let your ankle heal   Find out how long your ligament needs to heal  Do not do any physical activity until your healthcare provider says it is okay  If you start activity too soon, you may develop a more serious injury  · Always warm up and stretch  before you exercise or play sports  · Use the right equipment  Always wear shoes that fit well and are made for the activity that you are doing  You may also need ankle supports, elbow and knee pads, or braces  Follow up with your doctor as directed:  Write down your questions so you remember to ask them during your visits  © Copyright [employer] 2021 Information is for End User's use only and may not be sold, redistributed or otherwise used for commercial purposes  All illustrations and images included in CareNotes® are the copyrighted property of A D A M [employer] Inc  or [employer]   The above information is an  only  It is not intended as medical advice for individual conditions or treatments  Talk to your doctor, nurse or pharmacist before following any medical regimen to see if it is safe and effective for you  **Portions of the record may have been created with voice recognition software  Occasional wrong word or "sound a like" substitutions may have occurred due to the inherent limitations of voice recognition software  Read the chart carefully and recognize, using context, where substitutions have occurred  **     Chief Complaint     Chief Complaint   Patient presents with    Ankle Pain     L ankle pain, injured it while playing [employer] 2 days ago  States she heard it crack         History of Present Illness       17-year-old female presents to clinic with complaints of left ankle pain x2 days  Patient lying bed Monday when she inverted her ankle and  Her to correct    She has been able to bear weight but with difficulty  Denies any numbness but has occasional tingling  Review of Systems     Review of Systems   Respiratory: Negative for shortness of breath  Cardiovascular: Negative for chest pain  Musculoskeletal: Positive for arthralgias and joint swelling  Negative for back pain, gait problem, myalgias and neck pain  Skin: Positive for color change  Negative for wound  Neurological: Negative for dizziness, weakness, numbness and headaches  Current Medications     Current Outpatient Medications:     levonorgestrel (RANDOLPH) 13 5 MG intrauterine device, 1 each by Intrauterine route, Disp: , Rfl:     sertraline (ZOLOFT) 100 mg tablet, take 1 1/2 tablets by mouth daily (Patient taking differently: 100 mg take 1 and 1/2 tablets by mouth daily  Patient is only taking 1 tablet daily ), Disp: 135 tablet, Rfl: 2    propranolol (INDERAL) 10 mg tablet, take 1 tablet by mouth three times a day (Patient not taking: Reported on 9/8/2021), Disp: 90 tablet, Rfl: 0    Current Allergies     Allergies as of 09/08/2021    (No Known Allergies)            The following portions of the patient's history were reviewed and updated as appropriate: allergies, current medications, past family history, past medical history, past social history, past surgical history and problem list      Past Medical History:   Diagnosis Date    Closed fracture of radius and ulna 5/8/2006    Concussion 9/21/2015    [facility]    POTS (postural orthostatic tachycardia syndrome)     POTS (postural orthostatic tachycardia syndrome)     Sleep apnea        History reviewed  No pertinent surgical history  History reviewed  No pertinent family history  Medications have been verified          Objective     /70   Pulse 83   Temp 98 3 °F (36 8 °C) (Temporal)   Resp 16   Ht 5' 3" (1 6 m)   Wt 80 7 kg (178 lb)   SpO2 99%   BMI 31 53 kg/m²        Physical Exam     Physical Exam  Vitals and nursing note reviewed  Constitutional:       General: She is not in acute distress  Appearance: Normal appearance  HENT:      Head: Normocephalic and atraumatic  Cardiovascular:      Rate and Rhythm: Normal rate  Pulmonary:      Effort: Pulmonary effort is normal    Musculoskeletal:      Left ankle: Swelling present  No deformity or lacerations  Tenderness present over the ATF ligament  Normal range of motion  Left Achilles Tendon: Normal       Left foot: Normal    Skin:     General: Skin is warm and dry  Findings: Bruising present  Neurological:      Mental Status: She is alert  Sensory: No sensory deficit

## 2021-09-08 NOTE — PATIENT INSTRUCTIONS
Ankle brace  Rest, ice and elevation  Tylenol and motrin  pcp follow up or return to clinic with new or worsening symptoms  Ankle Sprain   WHAT YOU NEED TO KNOW:   An ankle sprain happens when 1 or more ligaments in your ankle joint stretch or tear  Ligaments are tough tissues that connect bones  Ligaments support your joints and keep your bones in place  DISCHARGE INSTRUCTIONS:   Return to the emergency department if:   · You have severe pain in your ankle  · Your foot or toes are cold or numb  · Your ankle becomes more weak or unstable (wobbly)  · You are unable to put any weight on your ankle or foot  · Your swelling has increased or returned  Call your doctor if:   · Your pain does not go away, even after treatment  · You have questions or concerns about your condition or care  Medicines: You may need any of the following:  · NSAIDs , such as ibuprofen, help decrease swelling, pain, and fever  This medicine is available with or without a doctor's order  NSAIDs can cause stomach bleeding or kidney problems in certain people  If you take blood thinner medicine, always ask your healthcare provider if NSAIDs are safe for you  Always read the medicine label and follow directions  · Acetaminophen  decreases pain and fever  It is available without a doctor's order  Ask how much to take and how often to take it  Follow directions  Read the labels of all other medicines you are using to see if they also contain acetaminophen, or ask your doctor or pharmacist  Acetaminophen can cause liver damage if not taken correctly  Do not use more than 4 grams (4,000 milligrams) total of acetaminophen in one day  · Prescription pain medicine  may be given  Ask your healthcare provider how to take this medicine safely  Some prescription pain medicines contain acetaminophen  Do not take other medicines that contain acetaminophen without talking to your healthcare provider   Too much acetaminophen may cause liver damage  Prescription pain medicine may cause constipation  Ask your healthcare provider how to prevent or treat constipation  · Take your medicine as directed  Contact your healthcare provider if you think your medicine is not helping or if you have side effects  Tell him or her if you are allergic to any medicine  Keep a list of the medicines, vitamins, and herbs you take  Include the amounts, and when and why you take them  Bring the list or the pill bottles to follow-up visits  Carry your medicine list with you in case of an emergency  Self-care:   · Use support devices,  such as a brace, cast, or splint, to limit your movement and protect your joint  You may need to use crutches to decrease your pain as you move around  · Go to physical therapy as directed  A physical therapist teaches you exercises to help improve movement and strength, and to decrease pain  · Rest  your ankle so that it can heal  Return to normal activities as directed  · Apply ice  on your ankle for 15 to 20 minutes every hour or as directed  Use an ice pack, or put crushed ice in a plastic bag  Cover it with a towel  Ice helps prevent tissue damage and decreases swelling and pain  · Compress  your ankle  Ask if you should wrap an elastic bandage around your injured ligament  An elastic bandage provides support and helps decrease swelling and movement so your joint can heal  Wear as long as directed  · Elevate  your ankle above the level of your heart as often as you can  This will help decrease swelling and pain  Prop your ankle on pillows or blankets to keep it elevated comfortably  Prevent another ankle sprain:   · Let your ankle heal   Find out how long your ligament needs to heal  Do not do any physical activity until your healthcare provider says it is okay  If you start activity too soon, you may develop a more serious injury      · Always warm up and stretch  before you exercise or play sports  · Use the right equipment  Always wear shoes that fit well and are made for the activity that you are doing  You may also need ankle supports, elbow and knee pads, or braces  Follow up with your doctor as directed:  Write down your questions so you remember to ask them during your visits  © [employer] 2021 Information is for End User's use only and may not be sold, redistributed or otherwise used for commercial purposes  All illustrations and images included in CareNotes® are the copyrighted property of [employer]  or [facility] Digna Engel   The above information is an  only  It is not intended as medical advice for individual conditions or treatments  Talk to your doctor, nurse or pharmacist before following any medical regimen to see if it is safe and effective for you

## 2022-01-03 DIAGNOSIS — F41.9 ANXIETY: ICD-10-CM

## 2022-01-03 RX ORDER — SERTRALINE HYDROCHLORIDE 100 MG/1
TABLET, FILM COATED ORAL
Qty: 135 TABLET | Refills: 0 | Status: SHIPPED | OUTPATIENT
Start: 2022-01-03 | End: 2022-01-05

## 2022-01-05 ENCOUNTER — OFFICE VISIT (OUTPATIENT)
Dept: FAMILY MEDICINE CLINIC | Facility: CLINIC | Age: 27
End: 2022-01-05
Payer: COMMERCIAL

## 2022-01-05 VITALS
OXYGEN SATURATION: 98 % | SYSTOLIC BLOOD PRESSURE: 98 MMHG | WEIGHT: 181 LBS | TEMPERATURE: 98.9 F | HEIGHT: 63 IN | DIASTOLIC BLOOD PRESSURE: 68 MMHG | HEART RATE: 101 BPM | BODY MASS INDEX: 32.07 KG/M2

## 2022-01-05 DIAGNOSIS — F41.9 ANXIETY: Primary | ICD-10-CM

## 2022-01-05 PROBLEM — G47.33 OSA (OBSTRUCTIVE SLEEP APNEA): Status: ACTIVE | Noted: 2022-01-05

## 2022-01-05 PROBLEM — I49.8 POTS (POSTURAL ORTHOSTATIC TACHYCARDIA SYNDROME): Status: RESOLVED | Noted: 2018-05-04 | Resolved: 2022-01-05

## 2022-01-05 PROBLEM — G90.9 AUTONOMIC DYSFUNCTION: Status: RESOLVED | Noted: 2021-05-23 | Resolved: 2022-01-05

## 2022-01-05 PROBLEM — G90.A POTS (POSTURAL ORTHOSTATIC TACHYCARDIA SYNDROME): Status: RESOLVED | Noted: 2018-05-04 | Resolved: 2022-01-05

## 2022-01-05 PROCEDURE — 1036F TOBACCO NON-USER: CPT | Performed by: FAMILY MEDICINE

## 2022-01-05 PROCEDURE — 3008F BODY MASS INDEX DOCD: CPT | Performed by: FAMILY MEDICINE

## 2022-01-05 PROCEDURE — 99214 OFFICE O/P EST MOD 30 MIN: CPT | Performed by: FAMILY MEDICINE

## 2022-01-05 RX ORDER — VENLAFAXINE HYDROCHLORIDE 37.5 MG/1
37.5 CAPSULE, EXTENDED RELEASE ORAL
Qty: 30 CAPSULE | Refills: 1 | Status: SHIPPED | OUTPATIENT
Start: 2022-01-05 | End: 2022-02-02 | Stop reason: SDUPTHER

## 2022-01-05 NOTE — PROGRESS NOTES
Sonja Bonner 1995 female MRN: [id number]      ASSESSMENT/PLAN  Problem List Items Addressed This Visit        Other    Anxiety - Primary        Pt would like to trial alternative medication  Will start Effexor  Discussed possible ADRs including GI upset, somnolence, initial worsening of symptoms  Reviewed delayed onset to max therapeutic potential  F/u in 4 weeks to monitor, to call if not tolerating prior  No future appointments  SUBJECTIVE  CC: Follow-up (discuss medication - states she has been on it for a long time and would like an alternative for anxiety )      HPI:  Sonja Bonner is a 27 y o  female who presents due to anxiety  Has "a lot going on right now" "I can't keep myself together"   Has been on Zoloft for approximately 10 years   Has decreased from 150 to 100 mg, and then subsequently went to 50 mg -- can feel some "withdrawal symptoms" ("super heightened emotions")   Feels like she may not "know" what some emotions feel like -- feels that Zolofr makes her "numb"  Doesn't feel like she has depression so much anymore, but has increased anxiety   Therapy has not helped in the past (has tried multiple different providers)   Had SI with Lexapro    Review of Systems   Psychiatric/Behavioral: The patient is nervous/anxious  Historical Information   The patient history was reviewed and updated as follows:    Past Medical History:   Diagnosis Date    Autonomic dysfunction 5/23/2021    Closed fracture of radius and ulna 5/8/2006    Concussion 9/21/2015    Late effect of [facility] ([facility] ) 9/21/2015    POTS (postural orthostatic tachycardia syndrome)     POTS (postural orthostatic tachycardia syndrome)     Sleep apnea      No past surgical history on file  No family history on file     Social History   Social History     Substance and Sexual Activity   Alcohol Use Yes    Comment: socially     Social History     Substance and Sexual Activity   Drug Use Yes    Types: Marijuana    Comment: socially     Social History     Tobacco Use   Smoking Status Former Smoker    Packs/day: 0 50    Types: Cigarettes    Quit date: 3/1/2020    Years since quittin 8   Smokeless Tobacco Never Used       Medications:     Current Outpatient Medications:     levonorgestrel (RANDOLPH) 13 5 MG intrauterine device, 1 each by Intrauterine route, Disp: , Rfl:     sertraline (ZOLOFT) 100 mg tablet, take 1 1/2 tablets by mouth daily, Disp: 135 tablet, Rfl: 0  No Known Allergies    OBJECTIVE    Vitals:   Vitals:    22 1106   BP: 98/68   Pulse: 101   Temp: 98 9 °F (37 2 °C)   SpO2: 98%   Weight: 82 1 kg (181 lb)   Height: 5' 3" (1 6 m)           Physical Exam  Vitals and nursing note reviewed  Constitutional:       General: She is not in acute distress  Appearance: Normal appearance  HENT:      Head: Normocephalic and atraumatic  Pulmonary:      Effort: Pulmonary effort is normal  No respiratory distress  Neurological:      General: No focal deficit present  Mental Status: She is alert     Psychiatric:         Mood and Affect: Mood normal                     Jodi Frost DO  [facility]   2022  11:11 AM

## 2022-02-02 ENCOUNTER — OFFICE VISIT (OUTPATIENT)
Dept: FAMILY MEDICINE CLINIC | Facility: CLINIC | Age: 27
End: 2022-02-02
Payer: COMMERCIAL

## 2022-02-02 VITALS
SYSTOLIC BLOOD PRESSURE: 120 MMHG | BODY MASS INDEX: 32.07 KG/M2 | HEIGHT: 63 IN | OXYGEN SATURATION: 98 % | HEART RATE: 79 BPM | WEIGHT: 181 LBS | DIASTOLIC BLOOD PRESSURE: 78 MMHG

## 2022-02-02 DIAGNOSIS — L30.9 ECZEMA, UNSPECIFIED TYPE: ICD-10-CM

## 2022-02-02 DIAGNOSIS — F41.9 ANXIETY: Primary | ICD-10-CM

## 2022-02-02 PROCEDURE — 3008F BODY MASS INDEX DOCD: CPT | Performed by: FAMILY MEDICINE

## 2022-02-02 PROCEDURE — 99214 OFFICE O/P EST MOD 30 MIN: CPT | Performed by: FAMILY MEDICINE

## 2022-02-02 PROCEDURE — 1036F TOBACCO NON-USER: CPT | Performed by: FAMILY MEDICINE

## 2022-02-02 RX ORDER — VENLAFAXINE HYDROCHLORIDE 37.5 MG/1
37.5 CAPSULE, EXTENDED RELEASE ORAL
Qty: 90 CAPSULE | Refills: 1 | Status: SHIPPED | OUTPATIENT
Start: 2022-02-02 | End: 2022-05-04 | Stop reason: SDUPTHER

## 2022-02-02 RX ORDER — DIAPER,BRIEF,INFANT-TODD,DISP
EACH MISCELLANEOUS 2 TIMES DAILY PRN
Qty: 56 G | Refills: 1 | Status: SHIPPED | OUTPATIENT
Start: 2022-02-02 | End: 2022-05-04

## 2022-02-02 NOTE — PROGRESS NOTES
Masha Mukherjee 1995 female MRN: [id number]      ASSESSMENT/PLAN  Problem List Items Addressed This Visit        Musculoskeletal and Integument    Eczema    Relevant Medications    hydrocortisone 0 5 % cream       Other    Anxiety - Primary    Relevant Medications    venlafaxine (EFFEXOR-XR) 37 5 mg 24 hr capsule        Anxiety: Improved  Discussed continuing current dosing or trial of increase -- pt would like to stay on current regimen  Will f/u in 3 months to monitor  Eczema: Trial steroid cream  Continue supportive care with regular moisturizing and good hydration  No future appointments  SUBJECTIVE  CC: Medication Check (patient states she is doing well on medication )      HPI:  Masha Mukherjee is a 24 y o  female who presents for follow up of anxiety  Switched to Effexor from Zoloft   Doing better -- doesn't cry as much, feels she is able to focus on other things   Had some nausea initially, but this has resolved; not affecting her sleep     Pt also notes her eczema is flaring up  She moisturizers regularly but continues to have patches on her shoulder  Previously had a prescription cream to help with it and wonders if she can have that again  Review of Systems   Gastrointestinal: Positive for nausea (initially, now resolved)  Skin: Positive for rash  Psychiatric/Behavioral: Negative for sleep disturbance  The patient is nervous/anxious (improved)  Historical Information   The patient history was reviewed and updated as follows:    Past Medical History:   Diagnosis Date    Autonomic dysfunction 5/23/2021    Closed fracture of radius and ulna 5/8/2006    Concussion 9/21/2015    Late effect of intracranial [facility] ([facility] ) 9/21/2015    POTS (postural orthostatic tachycardia syndrome)     POTS (postural orthostatic tachycardia syndrome)     Sleep apnea      History reviewed  No pertinent surgical history  History reviewed  No pertinent family history     Social History Social History     Substance and Sexual Activity   Alcohol Use Yes    Comment: socially     Social History     Substance and Sexual Activity   Drug Use Yes    Types: Marijuana    Comment: socially     Social History     Tobacco Use   Smoking Status Former Smoker    Packs/day: 0 50    Types: Cigarettes    Quit date: 3/1/2020    Years since quittin 9   Smokeless Tobacco Never Used       Medications:     Current Outpatient Medications:     levonorgestrel (RANDOLPH) 13 5 MG intrauterine device, 1 each by Intrauterine route, Disp: , Rfl:     venlafaxine (EFFEXOR-XR) 37 5 mg 24 hr capsule, Take 1 capsule (37 5 mg total) by mouth daily with breakfast, Disp: 90 capsule, Rfl: 1    hydrocortisone 0 5 % cream, Apply topically 2 (two) times a day as needed for rash, Disp: 56 g, Rfl: 1  No Known Allergies    OBJECTIVE    Vitals:   Vitals:    22 1025   BP: 120/78   Pulse: 79   SpO2: 98%   Weight: 82 1 kg (181 lb)   Height: 5' 3" (1 6 m)           Physical Exam  Vitals and nursing note reviewed  Constitutional:       General: She is not in acute distress  Appearance: Normal appearance  HENT:      Head: Normocephalic and atraumatic  Pulmonary:      Effort: Pulmonary effort is normal  No respiratory distress  Neurological:      General: No focal deficit present  Mental Status: She is alert     Psychiatric:         Mood and Affect: Mood normal                     DO Blu Guill[address] [facility]   2022  10:33 AM

## 2022-05-04 ENCOUNTER — OFFICE VISIT (OUTPATIENT)
Dept: FAMILY MEDICINE CLINIC | Facility: CLINIC | Age: 27
End: 2022-05-04
Payer: COMMERCIAL

## 2022-05-04 VITALS
SYSTOLIC BLOOD PRESSURE: 112 MMHG | OXYGEN SATURATION: 99 % | BODY MASS INDEX: 31.36 KG/M2 | WEIGHT: 177 LBS | HEIGHT: 63 IN | HEART RATE: 95 BPM | DIASTOLIC BLOOD PRESSURE: 68 MMHG

## 2022-05-04 DIAGNOSIS — F41.9 ANXIETY: Primary | ICD-10-CM

## 2022-05-04 PROCEDURE — 99213 OFFICE O/P EST LOW 20 MIN: CPT | Performed by: FAMILY MEDICINE

## 2022-05-04 PROCEDURE — 1036F TOBACCO NON-USER: CPT | Performed by: FAMILY MEDICINE

## 2022-05-04 PROCEDURE — 3008F BODY MASS INDEX DOCD: CPT | Performed by: FAMILY MEDICINE

## 2022-05-04 PROCEDURE — 3725F SCREEN DEPRESSION PERFORMED: CPT | Performed by: FAMILY MEDICINE

## 2022-05-04 RX ORDER — VENLAFAXINE HYDROCHLORIDE 37.5 MG/1
37.5 CAPSULE, EXTENDED RELEASE ORAL
Qty: 90 CAPSULE | Refills: 1 | Status: SHIPPED | OUTPATIENT
Start: 2022-05-04

## 2022-05-04 NOTE — PROGRESS NOTES
Leonora Lopez 1995 female MRN: [id number]      ASSESSMENT/PLAN  Problem List Items Addressed This Visit        Other    Anxiety - Primary    Relevant Medications    venlafaxine (EFFEXOR-XR) 37 5 mg 24 hr capsule        Continues to do well on current regimen -- continue and f/u in 6 months to monitor, or sooner PRN  No future appointments  SUBJECTIVE  CC: Anxiety (medication check )      HPI:  Leonora Lopez is a 24 y o  female who presents with her Mom for anxiety follow up  Currently on Effexor -- "I can handle myself a lot better" "I don't cry everyday anymore"   Tolerating without issue     Review of Systems   Psychiatric/Behavioral:        (+) mood overall improved  Some residual anxiety/depression       Historical Information   The patient history was reviewed and updated as follows:    Past Medical History:   Diagnosis Date    Autonomic dysfunction 2021    Closed fracture of radius and ulna 2006    Concussion 2015    Late effect of [facility] ([facility] [address] [facility] 2015    POTS (postural orthostatic tachycardia syndrome)     POTS (postural orthostatic tachycardia syndrome)     Sleep apnea      History reviewed  No pertinent surgical history  History reviewed  No pertinent family history     Social History   Social History     Substance and Sexual Activity   Alcohol Use Yes    Comment: socially     Social History     Substance and Sexual Activity   Drug Use Yes    Types: Marijuana    Comment: socially     Social History     Tobacco Use   Smoking Status Former Smoker    Packs/day: 0 50    Types: Cigarettes    Quit date: 3/1/2020    Years since quittin 1   Smokeless Tobacco Never Used       Medications:     Current Outpatient Medications:     levonorgestrel (RANDOLPH) 13 5 MG intrauterine device, 1 each by Intrauterine route, Disp: , Rfl:     venlafaxine (EFFEXOR-XR) 37 5 mg 24 hr capsule, Take 1 capsule (37 5 mg total) by mouth daily with breakfast, Disp: 90 capsule, Rfl: 1  No Known Allergies    OBJECTIVE    Vitals:   Vitals:    05/04/22 1049   BP: 112/68   Pulse: 95   SpO2: 99%   Weight: 80 3 kg (177 lb)   Height: 5' 3" (1 6 m)           Physical Exam  Vitals and nursing note reviewed  Constitutional:       General: She is not in acute distress  Appearance: Normal appearance  HENT:      Head: Normocephalic and atraumatic  Pulmonary:      Effort: Pulmonary effort is normal  No respiratory distress  Neurological:      General: No focal deficit present  Mental Status: She is alert     Psychiatric:         Mood and Affect: Mood normal                     Jodi Frost DO  [facility]   5/4/2022  11:14 AM

## 2022-05-05 ENCOUNTER — AMB VIDEO VISIT (OUTPATIENT)
Dept: OTHER | Facility: HOSPITAL | Age: 27
End: 2022-05-05

## 2022-05-05 VITALS — TEMPERATURE: 100.4 F

## 2022-05-05 DIAGNOSIS — R50.9 FEVER, UNSPECIFIED FEVER CAUSE: Primary | ICD-10-CM

## 2022-05-05 PROCEDURE — 87636 SARSCOV2 & INF A&B AMP PRB: CPT | Performed by: NURSE PRACTITIONER

## 2022-05-05 PROCEDURE — ECARE PR SL URGENT CARE VIRTUAL VISIT: Performed by: NURSE PRACTITIONER

## 2022-05-05 RX ORDER — AMOXICILLIN AND CLAVULANATE POTASSIUM 875; 125 MG/1; MG/1
1 TABLET, FILM COATED ORAL EVERY 12 HOURS SCHEDULED
Qty: 20 TABLET | Refills: 0 | Status: SHIPPED | OUTPATIENT
Start: 2022-05-05 | End: 2022-05-15

## 2022-05-05 NOTE — PATIENT INSTRUCTIONS
As we discussed this may be secondary bacterial infection  Will start antibiotic  Take probiotic  Will test for covid and flu  If positive suspect onset of symptoms is today  Isolate for 5 days  Rest and drink extra fluids  OTC cough and cold medications as needed  Follow up with PCP if no improvement  Go to ER with any worsening symptoms, chest pain or sob

## 2022-05-05 NOTE — PROGRESS NOTES
Video Visit - Leonora Lopez 24 y o  female MRN: [id number]    REQUIRED DOCUMENTATION:         1  This [facility] was [facility] via [facility]  2  Provider located at [address] [place] [id number]  3  [facility] provider: ZEESHAN Cesar  4  Identify all parties in room with patient during [facility] visit:  Patient   5  After connecting through [employer], patient was identified by name and date of birth  Patient was then informed that this was a Telemedicine visit and that the exam was being conducted confidentially over secure lines  My office door was closed  No one else was in the room  Patient acknowledged consent and understanding of privacy and security of the Telemedicine visit  I informed the patient that I have reviewed their record in Epic and presented the opportunity for them to ask any questions regarding the visit today  The patient agreed to participate  This is a 24year old female here today for video visit  She states she has been sick for about 2 weeks  She felt better yesterday and then today feels worse again  She states she woke up with fever of 100 4  She is having chest congestion and yellow mucous  She has tried OTC cough and cold medication  She is not vaccinated for covid or flu  No chest pain or sob  No covid 19 exposure  She does work as realtor  Review of Systems   Constitutional: Positive for activity change, chills, fatigue and fever  HENT: Positive for congestion, rhinorrhea, sinus pressure and sinus pain  Respiratory: Positive for cough  Negative for shortness of breath and wheezing  Cardiovascular: Negative  Musculoskeletal: Negative  Skin: Negative  Neurological: Negative  Psychiatric/Behavioral: Negative  Physical Exam  Constitutional:       General: She is not in acute distress  Appearance: Normal appearance  She is not ill-appearing or toxic-appearing     HENT:      Head: Normocephalic and atraumatic  Comments: Sinus pressure  Nose: Congestion present  Eyes:      Conjunctiva/sclera: Conjunctivae normal    Pulmonary:      Effort: Pulmonary effort is normal  No respiratory distress  Comments: No cough during video visit, able to speak in full sentences   Skin:     Comments: No rash on head or neck  Neurological:      Mental Status: She is alert and oriented to person, place, and time  Psychiatric:         Mood and Affect: Mood normal          Behavior: Behavior normal          Thought Content: Thought content normal          Judgment: Judgment normal        Diagnoses and all orders for this visit:    Fever, unspecified fever cause  -     C[facility]  -     amoxicillin-clavulanate (AUGMENTIN) 875-125 mg per tablet; Take 1 tablet by mouth every 12 (twelve) hours for 10 days      Patient Instructions   As we discussed this may be secondary bacterial infection  Will start antibiotic  Take probiotic  Will test for covid and flu  If positive suspect onset of symptoms is today  Isolate for 5 days  Rest and drink extra fluids  OTC cough and cold medications as needed  Follow up with PCP if no improvement  Go to ER with any worsening symptoms, chest pain or sob  Follow up with PCP if not improved, if symptoms are worse, go to the ER

## 2022-05-05 NOTE — CARE ANYWHERE EVISITS
Visit Summary for Shea Aguilar - Gender: Female - Date of Birth: [id number]  Date: [id number] - Duration: 10 minutes  Patient: Shea Aguilar  Provider: Donna BYERS    Patient Contact Information  Address  [address] [facility]  [place]   [phone]    Visit Topics  Fever [Added By: Self - 2022-05-05]  Cold [Added By: Self - 2022-05-05]  Headache [Added By: Self - 2022-05-05]  Flu-Like Symptoms [Added By: Self - 2022-05-05]    Triage Questions   What is your current physical address in the event of a medical emergency? Answer []  Are you allergic to any medications? Answer []  Are you now or could you be pregnant? Answer []  Do you have any immune system compromise or chronic lung   disease? Answer []  Do you have any vulnerable family members in the home (infant, pregnant, cancer, elderly)? Answer []     Conversation Transcripts  [0A][0A] [Notification] You are connected with Elizabeth Flores, Urgent Care Specialist [0A][Notification] Faith Mcdonald is located in [place]  [0A][Notification] Faith Mcdonald has shared health history  Clement Smith  [0A]    Diagnosis  Fever, unspecified    Procedures  Value: 79023 Code: CPT-4 UNLISTED E&M SERVICE    Medications Prescribed    No prescriptions ordered    Electronically signed by: Elizabeth Talbert(NPI [id number])

## 2022-05-06 LAB
FLUAV RNA RESP QL NAA+PROBE: NEGATIVE
FLUBV RNA RESP QL NAA+PROBE: NEGATIVE
SARS-COV-2 RNA RESP QL NAA+PROBE: POSITIVE

## 2022-05-06 NOTE — RESULT ENCOUNTER NOTE
Unvaccinated, but no major risk factors aside from obesity  Attempted to call regarding +COVID results  "call cannot be completed at this time  "

## 2022-11-08 ENCOUNTER — TELEPHONE (OUTPATIENT)
Dept: FAMILY MEDICINE CLINIC | Facility: CLINIC | Age: 27
End: 2022-11-08

## 2022-11-08 NOTE — TELEPHONE ENCOUNTER
Just tried calling and got the message that "the person I was trying to call was not accepting calls at this time" Could NOT leave a detailed message      The first 4 pm I found was December 1 st

## 2022-11-08 NOTE — TELEPHONE ENCOUNTER
Pt had to cancel her appt, with Dr Wallace , for tomorrow as she now has a job and can only have a virtual but has to be after work hours  Oumar Patton 3:30 or later    Please advise what can be done    She said a detailed vm can be left

## 2022-11-08 NOTE — TELEPHONE ENCOUNTER
I believe it is just medication follow up, so ok to do virtually -- can schedule in a 4:00 time slot wherever I have one open

## 2022-11-17 DIAGNOSIS — F41.9 ANXIETY: ICD-10-CM

## 2022-11-17 RX ORDER — VENLAFAXINE HYDROCHLORIDE 37.5 MG/1
CAPSULE, EXTENDED RELEASE ORAL
Qty: 90 CAPSULE | Refills: 1 | Status: SHIPPED | OUTPATIENT
Start: 2022-11-17

## 2022-12-24 NOTE — PATIENT INSTRUCTIONS
Follow up    POTS dx at age 15  Will refill Propanolol  Anxiety-she feels as though her zoloft isnt working anymore  She has been taking this for 10 years  May decrease Sertraline to 100 mg daily  Overweight-has lost 10 pounds  Was not trying  Exercise- no purposeful exercise  Diet-eats one meal a day   Does not follow a specific diet
Fall with Harm Risk

## 2023-06-08 ENCOUNTER — OFFICE VISIT (OUTPATIENT)
Dept: FAMILY MEDICINE CLINIC | Facility: CLINIC | Age: 28
End: 2023-06-08

## 2023-06-08 VITALS
DIASTOLIC BLOOD PRESSURE: 90 MMHG | OXYGEN SATURATION: 100 % | HEIGHT: 63 IN | SYSTOLIC BLOOD PRESSURE: 126 MMHG | WEIGHT: 161 LBS | TEMPERATURE: 98.6 F | HEART RATE: 90 BPM | BODY MASS INDEX: 28.53 KG/M2

## 2023-06-08 DIAGNOSIS — F41.9 ANXIETY: Primary | ICD-10-CM

## 2023-06-08 DIAGNOSIS — R19.7 DIARRHEA, UNSPECIFIED TYPE: ICD-10-CM

## 2023-06-08 PROCEDURE — 99213 OFFICE O/P EST LOW 20 MIN: CPT | Performed by: FAMILY MEDICINE

## 2023-06-08 RX ORDER — VENLAFAXINE HYDROCHLORIDE 37.5 MG/1
CAPSULE, EXTENDED RELEASE ORAL
Qty: 90 CAPSULE | Refills: 1 | Status: SHIPPED | OUTPATIENT
Start: 2023-06-08

## 2023-06-08 RX ORDER — LOPERAMIDE HYDROCHLORIDE 2 MG/1
2 TABLET ORAL 4 TIMES DAILY PRN
Qty: 30 TABLET | Refills: 0 | Status: SHIPPED | OUTPATIENT
Start: 2023-06-08

## 2023-06-08 NOTE — PROGRESS NOTES
Zandra Tarango 1995 female MRN: [id number]      ASSESSMENT/PLAN  Problem List Items Addressed This Visit        Other    Anxiety - Primary    Relevant Medications    venlafaxine (EFFEXOR-XR) 37 5 mg 24 hr capsule   Other Visit Diagnoses     Diarrhea, unspecified type        Relevant Medications    loperamide (IMODIUM A-D) 2 MG tablet        Discussed options for management including restarting Effexor and weaning off more slowly vs treating symptoms  Ultimately, we decided to restart medication and wean  Will also provide Imodium for GI upset, encouraged to start probiotic as well  Encouraged good hydration and bland diet with slow reintroduction of normal diet as tolerated  Pt does have somewhat dry oral mucus membranes, but capillary refill and skin tugor are normal and pt is not tachycardic/hypotensive  To call if symptoms persist/worsen  No future appointments  SUBJECTIVE  CC: side effects      HPI:  Zandra Tarango is a 34 y o  female who presents with Mom to discuss medication  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   Earlier this month, got bronchitis, then developed laryngitis -- took Emergen-C and OTC medications   More recently, was running out of Effexor and ultimately decided to stop because she does not have health insurance and has been feeling mentally improved  She has not taken the medication in about 1 5 weeks  Had food poisoning 6/3-6/4 -- had vomiting and diarrhea  Took Emetrol and hasn't vomited since; however, continues to have diarrhea, feels fatigued, hands and feet are really cold, tingling in hands/lips   did take an OTC anti-diarrheal this AM without relief of symptoms   Having a great deal of cramping, 4 episodes of large volume diarrhea per day  Denies bloody stools  Has been trying to stay hydrated, has poor appetite  Of note, pt's Dad had similar symptoms when he stopped Effexor suddenly many years ago       Review of Systems   Constitutional: Positive for "appetite change and fatigue  Gastrointestinal: Positive for abdominal pain and diarrhea  Negative for blood in stool  Historical Information   The patient history was reviewed and updated as follows:    Past Medical History:   Diagnosis Date   • Autonomic dysfunction 5/23/2021   • Closed fracture of radius and ulna 5/8/2006   • Concussion 9/21/2015   • 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   • POTS (postural orthostatic tachycardia syndrome)    • POTS (postural orthostatic tachycardia syndrome)    • Sleep apnea      No past surgical history on file  No family history on file  Social History   Social History     Substance and Sexual Activity   Alcohol Use Yes    Comment: socially     Social History     Substance and Sexual Activity   Drug Use Yes   • Types: Marijuana    Comment: socially     Social History     Tobacco Use   Smoking Status Former   • Packs/day: 0 50   • Types: Cigarettes   • Quit date: 3/1/2020   • Years since quitting: 3 2   Smokeless Tobacco Never       Medications:     Current Outpatient Medications:   •  loperamide (IMODIUM A-D) 2 MG tablet, Take 1 tablet (2 mg total) by mouth 4 (four) times a day as needed for diarrhea, Disp: 30 tablet, Rfl: 0  •  venlafaxine (EFFEXOR-XR) 37 5 mg 24 hr capsule, Take 1 tablet by mouth daily x2 weeks, then take 1 tablet by mouth every other day x2 weeks, then off, Disp: 90 capsule, Rfl: 1  •  levonorgestrel (RANDOLPH) 13 5 MG intrauterine device, 1 each by Intrauterine route, Disp: , Rfl:   No Known Allergies    OBJECTIVE    Vitals:   Vitals:    06/08/23 1440   BP: 126/90   Pulse: 90   Temp: 98 6 °F (37 °C)   SpO2: 100%   Weight: 73 kg (161 lb)   Height: 5' 3\" (1 6 m)           Physical Exam  Vitals and nursing note reviewed  Constitutional:       General: She is not in acute distress  Appearance: Normal appearance  HENT:      Head: Normocephalic and atraumatic        Right Ear: Tympanic membrane, ear canal and external ear normal       " Left Ear: Tympanic membrane, ear canal and external ear normal       Nose: Nose normal       Mouth/Throat:      Pharynx: No oropharyngeal exudate or posterior oropharyngeal erythema  Comments: Mucus membranes somewhat tachy  Eyes:      Conjunctiva/sclera: Conjunctivae normal    Cardiovascular:      Rate and Rhythm: Normal rate and regular rhythm  Pulmonary:      Effort: Pulmonary effort is normal  No respiratory distress  Breath sounds: Normal breath sounds  Abdominal:      General: Bowel sounds are normal  There is no distension  Palpations: Abdomen is soft  Tenderness: There is no abdominal tenderness  Lymphadenopathy:      Cervical: No cervical adenopathy  Skin:     General: Skin is warm and dry  Capillary Refill: Capillary refill takes less than 2 seconds  Comments: Normal turgor   Neurological:      General: No focal deficit present  Mental Status: She is alert     Psychiatric:         Mood and Affect: Mood normal                     DO Blu Guillory [address] [facility]   6/8/2023  3:51 PM

## 2023-11-09 ENCOUNTER — CLINICAL SUPPORT (OUTPATIENT)
Dept: FAMILY MEDICINE CLINIC | Facility: CLINIC | Age: 28
End: 2023-11-09

## 2023-11-09 ENCOUNTER — AMB VIDEO VISIT (OUTPATIENT)
Dept: OTHER | Facility: HOSPITAL | Age: 28
End: 2023-11-09

## 2023-11-09 ENCOUNTER — TELEPHONE (OUTPATIENT)
Dept: FAMILY MEDICINE CLINIC | Facility: CLINIC | Age: 28
End: 2023-11-09

## 2023-11-09 ENCOUNTER — APPOINTMENT (OUTPATIENT)
Dept: LAB | Facility: HOSPITAL | Age: 28
End: 2023-11-09
Payer: COMMERCIAL

## 2023-11-09 DIAGNOSIS — J02.9 SORE THROAT: ICD-10-CM

## 2023-11-09 DIAGNOSIS — J02.9 SORE THROAT: Primary | ICD-10-CM

## 2023-11-09 LAB — S PYO AG THROAT QL: NEGATIVE

## 2023-11-09 PROCEDURE — 87880 STREP A ASSAY W/OPTIC: CPT | Performed by: FAMILY MEDICINE

## 2023-11-09 PROCEDURE — ECARE PR SL URGENT CARE VIRTUAL VISIT: Performed by: NURSE PRACTITIONER

## 2023-11-09 PROCEDURE — 87070 CULTURE OTHR SPECIMN AEROBIC: CPT | Performed by: FAMILY MEDICINE

## 2023-11-09 NOTE — TELEPHONE ENCOUNTER
Pt called and stated she had virtual visit with one of the doctors on [facility]. The doctor sent her order for throat culture. Pt went to Lab on [address], but they said they not doing this kind of test there and tell Pt to call her PCP. I talked to Dr. Luis Lynch and she said Pt can come to the office and we will do Rapid strep swab and if this will be positive we will sent out for throat culture.

## 2023-11-09 NOTE — PROGRESS NOTES
Required Documentation:  Encounter provider ZEESHAN Garber    Provider located at [address] [place] [phone]    Identify all parties in room with patient during virtual visit:  No one else    The patient was identified by name and date of birth. Nabeel Chinchilla was informed that this is a telemedicine visit and that the visit is being conducted through the [address] [place] [address] Dr platform. She agrees to proceed. .  My office door was closed. No one else was in the room. She acknowledged consent and understanding of privacy and security of the video platform. The patient has agreed to participate and understands they can discontinue the visit at any time. Verification of patient location:    Patient is located at home in the following state in which I hold an active license [place]    Patient is aware this is a billable service. Reason for visit is   Chief Complaint   Patient presents with    Sore Throat        Subjective  This is a 34year old female here today for video visit. She states she started with sore throat about 3 days ago. She thought it was related to allergies. She states she woke up this AM and is having some white marks on her tonsils. She denies any congestion and her voice changed. She has a mild cough to clear her throat. No fevers at this time. Past Medical History:   Diagnosis Date    Autonomic dysfunction 5/23/2021    Closed fracture of radius and ulna 5/8/2006    Concussion 9/21/2015    Late effect of intracranial injury ([address]) 9/21/2015    POTS (postural orthostatic tachycardia syndrome)     POTS (postural orthostatic tachycardia syndrome)     Sleep apnea        No past surgical history on file. No Known Allergies    Review of Systems   Constitutional:  Negative for activity change, chills, fatigue and fever. HENT:  Positive for congestion and sore throat.     Respiratory: Positive for cough. Neurological: Negative. Psychiatric/Behavioral: Negative. Video Exam    There were no vitals filed for this visit. Physical Exam  Constitutional:       General: She is not in acute distress. Appearance: Normal appearance. She is not ill-appearing or toxic-appearing. HENT:      Head: Normocephalic and atraumatic. Mouth/Throat:      Pharynx: Posterior oropharyngeal erythema present. Comments: No white exudate noted. Eyes:      Conjunctiva/sclera: Conjunctivae normal.   Pulmonary:      Effort: Pulmonary effort is normal. No respiratory distress. Comments: No  cough or audible wheezing   Neurological:      Mental Status: She is alert and oriented to person, place, and time. Psychiatric:         Mood and Affect: Mood normal.         Behavior: Behavior normal.         Thought Content: Thought content normal.         Judgment: Judgment normal.         Visit Time  Total Visit Duration: 7 minutes    Assessment/Plan:    Lety was seen today for sore throat. Diagnoses and all orders for this visit:    Sore throat  -     Throat culture; Future        Patient Instructions   Will send for throat  culture. Salt water gargles can be helpful. Tylenol or motrin as needed. Throat drops can be helpful. Follow up with PCP if no improvement. Go to ER with any worsening symptoms. Pharyngitis   AMBULATORY CARE:   Pharyngitis , or sore throat, is inflammation of the tissues and structures in your pharynx (throat). Pharyngitis is most often caused by bacteria or a virus. Other causes include smoking, allergies, or acid reflux. Signs and symptoms  depend on the cause of your pharyngitis.  You may have any of the following:  Sore throat or pain when you swallow    Fever, chills, and body aches    Hoarse or raspy voice    Cough, runny or stuffy nose, itchy or watery eyes    Headache    Upset stomach and loss of appetite    Whitish-yellow patches on the back of the throat    Tender, swollen lumps on the sides of the neck    Call your local emergency number (911 in the [address]) if:   You have trouble breathing or swallowing because your throat is swollen. Seek care immediately if:   You are drooling because it hurts too much to swallow. Your fever is higher than 102? F (39?C) or lasts longer than 3 days. You are confused. You taste blood. Call your doctor if:   Your throat pain gets worse. You have a painful lump in your throat that does not go away after 5 days. Your symptoms do not improve after 5 days. You have questions or concerns about your condition or care. Treatment:  Viral pharyngitis will go away on its own without treatment. Your sore throat should start to feel better in 3 to 5 days. You may need any of the following:  Antibiotics  treat a bacterial infection. NSAIDs  help decrease swelling and pain or fever. This medicine is available with or without a doctor's order. NSAIDs can cause stomach bleeding or kidney problems in certain people. If you take blood thinner medicine, always ask your healthcare provider if NSAIDs are safe for you. Always read the medicine label and follow directions. Acetaminophen  decreases pain and fever. It is available without a doctor's order. Ask how much to take and how often to take it. Follow directions. Read the labels of all other medicines you are using to see if they also contain acetaminophen, or ask your doctor or pharmacist. Acetaminophen can cause liver damage if not taken correctly. Manage your symptoms:   Gargle salt water. Mix ¼ teaspoon salt in an 8 ounce glass of warm water and gargle. Do not swallow. Salt water may help decrease swelling in your throat. Drink liquids as directed. You may need to drink more liquids than usual. Liquids may help soothe your throat and prevent dehydration. Ask how much liquid to drink each day and which liquids are best for you.     Use a cool mist humidifier. This will add moisture to the air and help decrease your cough. Soothe your throat. Cough drops, ice, soft foods, or popsicles may help decrease throat pain. Prevent the spread of pharyngitis:  Cover your mouth and nose when you cough or sneeze. Do not share food or drinks. Wash your hands often. Use soap and water. If soap and water are unavailable, use an alcohol-based hand . Follow up with your doctor as directed:  Write down your questions so you remember to ask them during your visits. © Copyright Jovi Erickson 2023 Information is for End User's use only and may not be sold, redistributed or otherwise used for commercial purposes. The above information is an  only. It is not intended as medical advice for individual conditions or treatments. Talk to your doctor, nurse or pharmacist before following any medical regimen to see if it is safe and effective for you.

## 2023-11-09 NOTE — PATIENT INSTRUCTIONS
Will send for throat  culture. Salt water gargles can be helpful. Tylenol or motrin as needed. Throat drops can be helpful. Follow up with PCP if no improvement. Go to ER with any worsening symptoms. Pharyngitis   AMBULATORY CARE:   Pharyngitis , or sore throat, is inflammation of the tissues and structures in your pharynx (throat). Pharyngitis is most often caused by bacteria or a virus. Other causes include smoking, allergies, or acid reflux. Signs and symptoms  depend on the cause of your pharyngitis. You may have any of the following:  Sore throat or pain when you swallow    Fever, chills, and body aches    Hoarse or raspy voice    Cough, runny or stuffy nose, itchy or watery eyes    Headache    Upset stomach and loss of appetite    Whitish-yellow patches on the back of the throat    Tender, swollen lumps on the sides of the neck    Call your local emergency number (911 in the [address]) if:   You have trouble breathing or swallowing because your throat is swollen. Seek care immediately if:   You are drooling because it hurts too much to swallow. Your fever is higher than 102? F (39?C) or lasts longer than 3 days. You are confused. You taste blood. Call your doctor if:   Your throat pain gets worse. You have a painful lump in your throat that does not go away after 5 days. Your symptoms do not improve after 5 days. You have questions or concerns about your condition or care. Treatment:  Viral pharyngitis will go away on its own without treatment. Your sore throat should start to feel better in 3 to 5 days. You may need any of the following:  Antibiotics  treat a bacterial infection. NSAIDs  help decrease swelling and pain or fever. This medicine is available with or without a doctor's order. NSAIDs can cause stomach bleeding or kidney problems in certain people. If you take blood thinner medicine, always ask your healthcare provider if NSAIDs are safe for you.  Always read the medicine label and follow directions. Acetaminophen  decreases pain and fever. It is available without a doctor's order. Ask how much to take and how often to take it. Follow directions. Read the labels of all other medicines you are using to see if they also contain acetaminophen, or ask your doctor or pharmacist. Acetaminophen can cause liver damage if not taken correctly. Manage your symptoms:   Gargle salt water. Mix ¼ teaspoon salt in an 8 ounce glass of warm water and gargle. Do not swallow. Salt water may help decrease swelling in your throat. Drink liquids as directed. You may need to drink more liquids than usual. Liquids may help soothe your throat and prevent dehydration. Ask how much liquid to drink each day and which liquids are best for you. Use a cool mist humidifier. This will add moisture to the air and help decrease your cough. Soothe your throat. Cough drops, ice, soft foods, or popsicles may help decrease throat pain. Prevent the spread of pharyngitis:  Cover your mouth and nose when you cough or sneeze. Do not share food or drinks. Wash your hands often. Use soap and water. If soap and water are unavailable, use an alcohol-based hand . Follow up with your doctor as directed:  Write down your questions so you remember to ask them during your visits. © Copyright Tylor Lambert 2023 Information is for End User's use only and may not be sold, redistributed or otherwise used for commercial purposes. The above information is an  only. It is not intended as medical advice for individual conditions or treatments. Talk to your doctor, nurse or pharmacist before following any medical regimen to see if it is safe and effective for you.

## 2023-11-09 NOTE — CARE ANYWHERE EVISITS
Visit Summary for Tang Granados - Gender: Female - Date of Birth: [id number]  Date: [id number] - Duration: 5 minutes  Patient: Tang Granados  Provider: Matheus BYERS    Patient Contact Information  Address  [address] [facility]; [address] [place] [address]  [phone]    Visit Topics    Triage Questions   What is your current physical address in the event of a medical emergency? Answer []  Are you allergic to any medications? Answer []  Are you now or could you be pregnant? Answer []  Do you have any immune system compromise or chronic lung   disease? Answer []  Do you have any vulnerable family members in the home (infant, pregnant, cancer, elderly)? Answer []     Conversation Transcripts  [0A][0A] [Notification] You are connected with Elizabeth BYERS, Urgent Care Specialist.[0A][Notification] Tang Granados is located in [place]. [0A][Notification] Tang Granados has shared health history. [facility] .[0A]    Diagnosis  Acute pharyngitis, unspecified    Procedures  Value: 51023 Code: CPT-4 UNLISTED E&M SERVICE    Medications Prescribed    No prescriptions ordered    Electronically signed by: Elizabeth Bonds(NPI [id number])

## 2023-11-11 LAB — BACTERIA THROAT CULT: NORMAL

## 2024-01-04 ENCOUNTER — TELEPHONE (OUTPATIENT)
Dept: FAMILY MEDICINE CLINIC | Facility: CLINIC | Age: 29
End: 2024-01-04

## 2024-01-04 ENCOUNTER — HOSPITAL ENCOUNTER (EMERGENCY)
Facility: HOSPITAL | Age: 29
Discharge: HOME | End: 2024-01-05
Attending: OBSTETRICS & GYNECOLOGY

## 2024-01-04 ENCOUNTER — APPOINTMENT (EMERGENCY)
Dept: ULTRASOUND IMAGING | Facility: HOSPITAL | Age: 29
End: 2024-01-04

## 2024-01-04 ENCOUNTER — HOSPITAL ENCOUNTER (EMERGENCY)
Facility: HOSPITAL | Age: 29
End: 2024-01-04
Attending: EMERGENCY MEDICINE

## 2024-01-04 VITALS
TEMPERATURE: 98.1 F | DIASTOLIC BLOOD PRESSURE: 63 MMHG | RESPIRATION RATE: 16 BRPM | HEART RATE: 103 BPM | SYSTOLIC BLOOD PRESSURE: 116 MMHG | OXYGEN SATURATION: 97 %

## 2024-01-04 DIAGNOSIS — O00.90 ECTOPIC PREGNANCY: Primary | ICD-10-CM

## 2024-01-04 DIAGNOSIS — Z34.90 PREGNANCY: Primary | ICD-10-CM

## 2024-01-04 LAB
ALBUMIN SERPL BCP-MCNC: 4.5 G/DL (ref 3.5–5)
ALP SERPL-CCNC: 55 U/L (ref 34–104)
ALT SERPL W P-5'-P-CCNC: 17 U/L (ref 7–52)
ANION GAP SERPL CALCULATED.3IONS-SCNC: 11 MMOL/L
AST SERPL W P-5'-P-CCNC: 15 U/L (ref 13–39)
B-HCG SERPL-ACNC: 859 MIU/ML (ref 0–5)
BASOPHILS # BLD AUTO: 0.03 THOUSANDS/ÂΜL (ref 0–0.1)
BASOPHILS NFR BLD AUTO: 0 % (ref 0–1)
BILIRUB SERPL-MCNC: 0.49 MG/DL (ref 0.2–1)
BUN SERPL-MCNC: 7 MG/DL (ref 5–25)
CALCIUM SERPL-MCNC: 9.5 MG/DL (ref 8.4–10.2)
CHLORIDE SERPL-SCNC: 105 MMOL/L (ref 96–108)
CO2 SERPL-SCNC: 20 MMOL/L (ref 21–32)
CREAT SERPL-MCNC: 0.63 MG/DL (ref 0.6–1.3)
EOSINOPHIL # BLD AUTO: 0.01 THOUSAND/ÂΜL (ref 0–0.61)
EOSINOPHIL NFR BLD AUTO: 0 % (ref 0–6)
ERYTHROCYTE [DISTWIDTH] IN BLOOD BY AUTOMATED COUNT: 12.3 % (ref 11.6–15.1)
GFR SERPL CREATININE-BSD FRML MDRD: 124 ML/MIN/1.73SQ M
GLUCOSE SERPL-MCNC: 119 MG/DL (ref 65–140)
HCT VFR BLD AUTO: 42.2 % (ref 34.8–46.1)
HGB BLD-MCNC: 14.3 G/DL (ref 11.5–15.4)
IMM GRANULOCYTES # BLD AUTO: 0.02 THOUSAND/UL (ref 0–0.2)
IMM GRANULOCYTES NFR BLD AUTO: 0 % (ref 0–2)
LIPASE SERPL-CCNC: 12 U/L (ref 11–82)
LYMPHOCYTES # BLD AUTO: 0.86 THOUSANDS/ÂΜL (ref 0.6–4.47)
LYMPHOCYTES NFR BLD AUTO: 9 % (ref 14–44)
MCH RBC QN AUTO: 31.6 PG (ref 26.8–34.3)
MCHC RBC AUTO-ENTMCNC: 33.9 G/DL (ref 31.4–37.4)
MCV RBC AUTO: 93 FL (ref 82–98)
MONOCYTES # BLD AUTO: 0.47 THOUSAND/ÂΜL (ref 0.17–1.22)
MONOCYTES NFR BLD AUTO: 5 % (ref 4–12)
NEUTROPHILS # BLD AUTO: 7.82 THOUSANDS/ÂΜL (ref 1.85–7.62)
NEUTS SEG NFR BLD AUTO: 86 % (ref 43–75)
NRBC BLD AUTO-RTO: 0 /100 WBCS
PLATELET # BLD AUTO: 250 THOUSANDS/UL (ref 149–390)
PMV BLD AUTO: 10.6 FL (ref 8.9–12.7)
POTASSIUM SERPL-SCNC: 3.3 MMOL/L (ref 3.5–5.3)
PROT SERPL-MCNC: 7.4 G/DL (ref 6.4–8.4)
RBC # BLD AUTO: 4.52 MILLION/UL (ref 3.81–5.12)
SODIUM SERPL-SCNC: 136 MMOL/L (ref 135–147)
WBC # BLD AUTO: 9.21 THOUSAND/UL (ref 4.31–10.16)

## 2024-01-04 PROCEDURE — 36415 COLL VENOUS BLD VENIPUNCTURE: CPT

## 2024-01-04 PROCEDURE — 99284 EMERGENCY DEPT VISIT MOD MDM: CPT

## 2024-01-04 PROCEDURE — 96374 THER/PROPH/DIAG INJ IV PUSH: CPT

## 2024-01-04 PROCEDURE — 76801 OB US < 14 WKS SINGLE FETUS: CPT

## 2024-01-04 PROCEDURE — 96361 HYDRATE IV INFUSION ADD-ON: CPT

## 2024-01-04 PROCEDURE — 80053 COMPREHEN METABOLIC PANEL: CPT | Performed by: EMERGENCY MEDICINE

## 2024-01-04 PROCEDURE — 93005 ELECTROCARDIOGRAM TRACING: CPT

## 2024-01-04 PROCEDURE — 84702 CHORIONIC GONADOTROPIN TEST: CPT | Performed by: EMERGENCY MEDICINE

## 2024-01-04 PROCEDURE — 85025 COMPLETE CBC W/AUTO DIFF WBC: CPT | Performed by: EMERGENCY MEDICINE

## 2024-01-04 PROCEDURE — 83690 ASSAY OF LIPASE: CPT | Performed by: EMERGENCY MEDICINE

## 2024-01-04 PROCEDURE — 99285 EMERGENCY DEPT VISIT HI MDM: CPT | Performed by: EMERGENCY MEDICINE

## 2024-01-04 RX ORDER — ONDANSETRON 2 MG/ML
4 INJECTION INTRAMUSCULAR; INTRAVENOUS ONCE
Status: COMPLETED | OUTPATIENT
Start: 2024-01-04 | End: 2024-01-04

## 2024-01-04 RX ADMIN — ONDANSETRON 4 MG: 2 INJECTION INTRAMUSCULAR; INTRAVENOUS at 19:30

## 2024-01-04 RX ADMIN — SODIUM CHLORIDE 1000 ML: 0.9 INJECTION, SOLUTION INTRAVENOUS at 19:28

## 2024-01-04 NOTE — TELEPHONE ENCOUNTER
Lety called the office very scared and upset. She has an IUD and had bled for the first time in 3 months on . She said that it was bright red blood and it lasted a couple days. She has anxiety and is unsure if her additional symptoms are that or something else. She is light headed, clammy crampy and has a rapid feeling heart rate. Lety googled these things and read that these could be signs of a pregnancy with an IUD so she took a clear blue pregnancy test and it came out positive. She has no insurance and does not know what to do. She wants to look into  if she is pregnant. She does have an OBGYN but is asking to see you. I scheduled her for your 1st available () unsure if she should be waiting or if there is anything she should do in the meantime.

## 2024-01-04 NOTE — LETTER
[facility]ERGENC[facility]  [address]  [place] [address]  Dept: [phone]    January 5, 2024     Patient: Lety Barrera   YOB: 1995   Date of Visit: 1/4/2024       To Whom it May Concern:    Lety Barrera is under my professional care. She was seen in the hospital from 1/4/2024 to 01/05/24. She may return to work on 1/8/24 without limitations. Her partner Tanesha Santana was present for her care and should be excused as well.    If you have any questions or concerns, please don't hesitate to call.         Sincerely,          Tree Terry MD

## 2024-01-05 ENCOUNTER — TELEPHONE (OUTPATIENT)
Dept: OBGYN CLINIC | Facility: CLINIC | Age: 29
End: 2024-01-05

## 2024-01-05 VITALS
HEART RATE: 97 BPM | RESPIRATION RATE: 16 BRPM | OXYGEN SATURATION: 100 % | SYSTOLIC BLOOD PRESSURE: 118 MMHG | HEIGHT: 63 IN | TEMPERATURE: 98.5 F | DIASTOLIC BLOOD PRESSURE: 71 MMHG | BODY MASS INDEX: 30.2 KG/M2 | WEIGHT: 170.42 LBS

## 2024-01-05 DIAGNOSIS — O00.90 ECTOPIC PREGNANCY, UNSPECIFIED LOCATION, UNSPECIFIED WHETHER INTRAUTERINE PREGNANCY PRESENT: Primary | ICD-10-CM

## 2024-01-05 LAB
ATRIAL RATE: 110 BPM
P AXIS: 74 DEGREES
PR INTERVAL: 134 MS
QRS AXIS: 70 DEGREES
QRSD INTERVAL: 80 MS
QT INTERVAL: 334 MS
QTC INTERVAL: 452 MS
T WAVE AXIS: 36 DEGREES
VENTRICULAR RATE: 110 BPM

## 2024-01-05 PROCEDURE — 96374 THER/PROPH/DIAG INJ IV PUSH: CPT

## 2024-01-05 PROCEDURE — 96372 THER/PROPH/DIAG INJ SC/IM: CPT

## 2024-01-05 PROCEDURE — NC001 PR NO CHARGE: Performed by: OBSTETRICS & GYNECOLOGY

## 2024-01-05 RX ORDER — HYDROXYZINE HYDROCHLORIDE 25 MG/1
25 TABLET, FILM COATED ORAL EVERY 6 HOURS PRN
Qty: 30 TABLET | Refills: 0 | Status: SHIPPED | OUTPATIENT
Start: 2024-01-05

## 2024-01-05 RX ORDER — ONDANSETRON 2 MG/ML
4 INJECTION INTRAMUSCULAR; INTRAVENOUS ONCE
Status: COMPLETED | OUTPATIENT
Start: 2024-01-05 | End: 2024-01-05

## 2024-01-05 RX ORDER — METHOTREXATE 25 MG/ML
50 INJECTION INTRA-ARTERIAL; INTRAMUSCULAR; INTRATHECAL; INTRAVENOUS ONCE
Status: DISCONTINUED | OUTPATIENT
Start: 2024-01-05 | End: 2024-01-05

## 2024-01-05 RX ORDER — METHOTREXATE 25 MG/ML
50 INJECTION INTRA-ARTERIAL; INTRAMUSCULAR; INTRATHECAL; INTRAVENOUS ONCE
Status: COMPLETED | OUTPATIENT
Start: 2024-01-05 | End: 2024-01-05

## 2024-01-05 RX ORDER — ONDANSETRON 4 MG/1
4 TABLET, FILM COATED ORAL EVERY 8 HOURS PRN
Qty: 20 TABLET | Refills: 0 | Status: SHIPPED | OUTPATIENT
Start: 2024-01-05

## 2024-01-05 RX ADMIN — METHOTREXATE 90.5 MG: 25 INJECTION INTRA-ARTERIAL; INTRAMUSCULAR; INTRATHECAL; INTRAVENOUS at 03:04

## 2024-01-05 RX ADMIN — ONDANSETRON 4 MG: 2 INJECTION INTRAMUSCULAR; INTRAVENOUS at 04:28

## 2024-01-05 NOTE — EMTALA/ACUTE CARE TRANSFER
[facility] EMERGENCY DEPARTMENT  [address]  [facility] [address]  Dept: [phone]      EMTALA TRANSFER CONSENT    NAME Lety Barrera                                         1995                              MRN [id number]    I have been informed of my rights regarding examination, treatment, and transfer   by Dr. Maximo Tian MD    Benefits: Specialized equipment and/or services available at the receiving facility (Include comment)________________________ (OB Services)    Risks: Potential for delay in receiving treatment      Transfer Request   I acknowledge that my medical condition has been evaluated and explained to me by the emergency department physician or other qualified medical person and/or my attending physician who has recommended and offered to me further medical examination and treatment. I understand the Hospital's obligation with respect to the treatment and stabilization of my emergency medical condition. I nevertheless request to be transferred. I release the Hospital, the doctor, and any other persons caring for me from all responsibility or liability for any injury or ill effects that may result from my transfer and agree to accept all responsibility for the consequences of my choice to transfer, rather than receive stabilizing treatment at the Hospital. I understand that because the transfer is my request, my insurance may not provide reimbursement for the services.  The Hospital will assist and direct me and my family in how to make arrangements for transfer, but the hospital is not liable for any fees charged by the transport service.  In spite of this understanding, I refuse to consent to further medical examination and treatment which has been offered to me, and request transfer to Accepting Facility Name, City & State : [facility]. I authorize the performance of emergency medical procedures and treatments upon me in both transit and upon arrival at the  receiving facility.  Additionally, I authorize the release of any and all medical records to the receiving facility and request they be transported with me, if possible.    I authorize the performance of emergency medical procedures and treatments upon me in both transit and upon arrival at the receiving facility.  Additionally, I authorize the release of any and all medical records to the receiving facility and request they be transported with me, if possible.  I understand that the safest mode of transportation during a medical emergency is an ambulance and that the Hospital advocates the use of this mode of transport. Risks of traveling to the receiving facility by car, including absence of medical control, life sustaining equipment, such as oxygen, and medical personnel has been explained to me and I fully understand them.    (SHAHZAD CORRECT BOX BELOW)  [  ]  I consent to the stated transfer and to be transported by ambulance/helicopter.  [  ]  I consent to the stated transfer, but refuse transportation by ambulance and accept full responsibility for my transportation by car.  I understand the risks of non-ambulance transfers and I exonerate the Hospital and its staff from any deterioration in my condition that results from this refusal.    X___________________________________________    DATE  24  TIME________  Signature of patient or legally responsible individual signing on patient behalf           RELATIONSHIP TO PATIENT_________________________          Provider Certification    NAME Lety Barrera                                         1995                              MRN [id number]    A medical screening exam was performed on the above named patient.  Based on the examination:    Condition Necessitating Transfer The encounter diagnosis was Pregnancy.    Patient Condition: The patient has been stabilized such that within reasonable medical probability, no material deterioration of the patient  condition or the condition of the unborn child(fadia) is likely to result from the transfer    Reason for Transfer: Level of Care needed not available at this facility    Transfer Requirements: Facility RA   Space available and qualified personnel available for treatment as acknowledged by    Agreed to accept transfer and to provide appropriate medical treatment as acknowledged by       Dr. Mckenna  Appropriate medical records of the examination and treatment of the patient are provided at the time of transfer   STAFF INITIAL WHEN COMPLETED _______  Transfer will be performed by qualified personnel from    and appropriate transfer equipment as required, including the use of necessary and appropriate life support measures.    Provider Certification: I have examined the patient and explained the following risks and benefits of being transferred/refusing transfer to the patient/family:         Based on these reasonable risks and benefits to the patient and/or the unborn child(fadia), and based upon the information available at the time of the patient’s examination, I certify that the medical benefits reasonably to be expected from the provision of appropriate medical treatments at another medical facility outweigh the increasing risks, if any, to the individual’s medical condition, and in the case of labor to the unborn child, from effecting the transfer.    X____________________________________________ DATE 01/04/24        TIME_______      ORIGINAL - SEND TO MEDICAL RECORDS   COPY - SEND WITH PATIENT DURING TRANSFER

## 2024-01-05 NOTE — ASSESSMENT & PLAN NOTE
HCG, Quant   Date Value Ref Range Status   01/04/2024 859 (H) 0 - 5 mIU/mL Final     Patient with history of amenorrhea with Love IUD presenting as transfer from [facility] with new positive pregnancy test and abdominal pain, nausea, and bright red spotting now resolved  TVUS c/w ectopic pregnancy in right adnexa without identified yolk sac or fetal pole  Benign abdominal exam, non-tender on palpation, non-surgical abdomen  Candidate for methotrexate treatment - LFTs non-elevated, no fetal pole with cardiac activity, no complex free fluid or signs of ruptured ectopic pregnancy  Reviewed side effect profile and strict return precautions with specific counseling regarding stomatitis, vaginal bleeding, and intractable abdominal pain  Plan for day 4 (Monday 1/8) and day 7 (Thursday 1/11) quantitative bHCG levels to assess trend  Counseled regarding possibility of second MTX treatment pending results with expected 15% decrease on day 7  Message sent to clinical staff at [facility] for appropriate follow-up  Plan to follow-up on day 7 for continued assessment in office

## 2024-01-05 NOTE — TELEPHONE ENCOUNTER
----- Message from Alannah Nicole MD sent at 1/4/2024  9:02 PM EST -----  Regarding: ectopic  Patient with IUD and suspected ectopic seen in [facility] 1/4/24 and given methotrexate.  (Undesired pregnancy, IUD reported in place on ultrasound, ectopic 1.8 cm seen by right ovary)  Will need follow up and quants.  I gave ER doc information on ordering quants and asked him to let her know may need repeat in dose in one week.

## 2024-01-05 NOTE — TELEPHONE ENCOUNTER
It looks like she went to ED last night -- can we please call and see if she needs any further assistance at this time?

## 2024-01-05 NOTE — H&P
CONSULTATION - OB/GYN  Lety Barrera 28 y.o. female MRN: [id number]  Unit/Bed#: ED-22 Encounter: [id number]        ASSESSMENT/PLAN:  29yo G1 with suspected right sided ectopic pregnancy presenting for methotrexate therapy. Vitals WNL, stable. Non-surgical abdomen. Plan by problem:    * Ectopic pregnancy without intrauterine pregnancy  Assessment & Plan  HCG, Quant   Date Value Ref Range Status   01/04/2024 859 (H) 0 - 5 mIU/mL Final     Patient with history of amenorrhea with Love IUD presenting as transfer from [facility] with new positive pregnancy test and abdominal pain, nausea, and bright red spotting now resolved  TVUS c/w ectopic pregnancy in right adnexa without identified yolk sac or fetal pole  Benign abdominal exam, non-tender on palpation, non-surgical abdomen  Candidate for methotrexate treatment - LFTs non-elevated, no fetal pole with cardiac activity, no complex free fluid or signs of ruptured ectopic pregnancy  Reviewed side effect profile and strict return precautions with specific counseling regarding stomatitis, vaginal bleeding, and intractable abdominal pain  Plan for day 4 (Monday 1/8) and day 7 (Thursday 1/11) quantitative bHCG levels to assess trend  Counseled regarding possibility of second MTX treatment pending results with expected 15% decrease on day 7  Message sent to clinical staff at [facility] for appropriate follow-up  Plan to follow-up on day 7 for continued assessment in office        US OB <14 weeks with transvaginal 1/4/24  FINDINGS:   UTERUS:  The uterus is anteverted in position, measuring 6.3 x 2.9 x 4.6 cm.  Contour and echotexture appear normal.  The cervix is closed and within normal limits. Tiny nabothian cyst measured in the cervix, usually clinically silent.  ENDOMETRIUM:  Endometrial stripe measures approximately 4 mm, measured by myself.  Normal endometrial thickness and echogenicity.  No evidence of intrauterine gestation.   Intrauterine device in  standard position.   OVARIES/ADNEXA:  There is no free fluid.   Right ovary:  2.7 x 2.5 x 2.2 cm. Volume 7.5  Doppler flow present.  1.8 cm nodule with central cyst and thick, hypervascular wall. No yolk sac or embryo.   Left ovary:  2.0 x 2.0 x 1.5 cm.  Volume  3.2  Doppler flow present.  No suspicious abnormality.     IMPRESSION:  No intrauterine gestation. Intrauterine device in standard position.  Right ovarian nodule suspicious for ectopic pregnancy.  No abnormal pelvic free fluid to suggest rupture.        SUBJECTIVE:    HPI: Lety Barrera is a 28 y.o. female who presents as a transfer from [facility] for assessment of right adnexal ectopic pregnancy. Patient reports that she has had reliable contraception with Love IUD, placed 8/26/2022 with her [facility]. She has been amenorrheic without any concerns since placement. Occasionally she will have brown discharge when her period would occur but otherwise no kecia menses. On December 29th, Lety reports start of bright red spotting that is abnormal for her. For the past several days, she has had intermittent stabbing pains in her right lower quadrant and took a pregnancy test at home with a positive result. She presented to the ED for further assessment with workup consistent with ectopic pregnancy as described above. We reviewed treatment options including methotrexate therapy or diagnostic laparoscopy. We discussed her current presentation, non-surgical abdomen, and imaging results without signs of ruptured ectopic pregnancy. After reviewing treatment options and risks and benefits of medical vs surgical management, patient was amenable to treatment plan with methotrexate. Thorough counseling regarding follow-up quantitative bHCG levels on days 4 and 7 along with strict return precautions were reviewed. All questions were answered at bedside with the patient and her . Lety verbalized understanding of appropriate follow-up and will follow-up in  office with  [facility] in one week in [place].      Review of Systems   Constitutional:  Negative for chills and fever.   HENT:  Negative for congestion, sinus pressure and sinus pain.    Eyes:  Negative for visual disturbance.   Respiratory:  Negative for cough, shortness of breath and wheezing.    Cardiovascular:  Negative for chest pain, palpitations and leg swelling.   Gastrointestinal:  Positive for abdominal pain and nausea. Negative for abdominal distention, constipation, diarrhea and vomiting.   Genitourinary:  Positive for vaginal bleeding (spotting). Negative for dysuria and vaginal discharge.   Skin:  Negative for color change, pallor and rash.   Neurological:  Negative for weakness and light-headedness.   Psychiatric/Behavioral:  Negative for agitation and behavioral problems.        Historical Information   Past Medical History:   Diagnosis Date    Autonomic dysfunction 2021    Closed fracture of radius and ulna 2006    Concussion 2015    Late effect of intracranial injury (HCC) 2015    POTS (postural orthostatic tachycardia syndrome)     POTS (postural orthostatic tachycardia syndrome)     Sleep apnea      History reviewed. No pertinent surgical history.  OB History    Para Term  AB Living   1             SAB IAB Ectopic Multiple Live Births                  # Outcome Date GA Lbr Genaro/2nd Weight Sex Delivery Anes PTL Lv   1 Current              History reviewed. No pertinent family history.  Social History   Social History     Substance and Sexual Activity   Alcohol Use Yes    Comment: socially     Social History     Substance and Sexual Activity   Drug Use Yes    Types: Marijuana    Comment: socially     Social History     Tobacco Use   Smoking Status Former    Current packs/day: 0.00    Types: Cigarettes    Quit date: 3/1/2020    Years since quitting: 3.8   Smokeless Tobacco Never       Meds/Allergies   No current facility-administered medications for this  encounter.       No Known Allergies      OBJECTIVE:    Vitals: Blood pressure 138/77, pulse 95, temperature 98.5 °F (36.9 °C), temperature source Oral, resp. rate 16, SpO2 100%. There is no height or weight on file to calculate BMI.    Physical Exam  Vitals and nursing note reviewed. Exam conducted with a chaperone present.   Constitutional:       General: She is not in acute distress.     Appearance: She is not ill-appearing.   HENT:      Head: Normocephalic.      Right Ear: External ear normal.      Left Ear: External ear normal.   Eyes:      General: No scleral icterus.        Right eye: No discharge.         Left eye: No discharge.      Conjunctiva/sclera: Conjunctivae normal.   Cardiovascular:      Rate and Rhythm: Normal rate.      Pulses: Normal pulses.   Pulmonary:      Effort: Pulmonary effort is normal.   Abdominal:      General: There is no distension.      Palpations: Abdomen is soft.      Tenderness: There is no abdominal tenderness. There is no guarding.   Musculoskeletal:         General: No swelling or tenderness. Normal range of motion.      Cervical back: Normal range of motion.      Right lower leg: No edema.      Left lower leg: No edema.   Skin:     General: Skin is warm and dry.      Capillary Refill: Capillary refill takes less than 2 seconds.   Neurological:      Mental Status: She is alert and oriented to person, place, and time. Mental status is at baseline.   Psychiatric:         Mood and Affect: Mood normal.         Behavior: Behavior normal.           Lab Results:   Recent Results (from the past 24 hour(s))   ECG 12 lead    Collection Time: 01/04/24  6:48 PM   Result Value Ref Range    Ventricular Rate 110 BPM    Atrial Rate 110 BPM    MN Interval 134 ms    QRSD Interval 80 ms    QT Interval 334 ms    QTC Interval 452 ms    P Eagle 74 degrees    QRS Axis 70 degrees    T Wave Axis 36 degrees   hCG, quantitative    Collection Time: 01/04/24  6:49 PM   Result Value Ref Range    HCG, Quant  859 (H) 0 - 5 mIU/mL   CBC and differential    Collection Time: 01/04/24  6:49 PM   Result Value Ref Range    WBC 9.21 4.31 - 10.16 Thousand/uL    RBC 4.52 3.81 - 5.12 Million/uL    Hemoglobin 14.3 11.5 - 15.4 g/dL    Hematocrit 42.2 34.8 - 46.1 %    MCV 93 82 - 98 fL    MCH 31.6 26.8 - 34.3 pg    MCHC 33.9 31.4 - 37.4 g/dL    RDW 12.3 11.6 - 15.1 %    MPV 10.6 8.9 - 12.7 fL    Platelets 250 149 - 390 Thousands/uL    nRBC 0 /100 WBCs    Neutrophils Relative 86 (H) 43 - 75 %    Immat GRANS % 0 0 - 2 %    Lymphocytes Relative 9 (L) 14 - 44 %    Monocytes Relative 5 4 - 12 %    Eosinophils Relative 0 0 - 6 %    Basophils Relative 0 0 - 1 %    Neutrophils Absolute 7.82 (H) 1.85 - 7.62 Thousands/µL    Immature Grans Absolute 0.02 0.00 - 0.20 Thousand/uL    Lymphocytes Absolute 0.86 0.60 - 4.47 Thousands/µL    Monocytes Absolute 0.47 0.17 - 1.22 Thousand/µL    Eosinophils Absolute 0.01 0.00 - 0.61 Thousand/µL    Basophils Absolute 0.03 0.00 - 0.10 Thousands/µL   Comprehensive metabolic panel    Collection Time: 01/04/24  6:49 PM   Result Value Ref Range    Sodium 136 135 - 147 mmol/L    Potassium 3.3 (L) 3.5 - 5.3 mmol/L    Chloride 105 96 - 108 mmol/L    CO2 20 (L) 21 - 32 mmol/L    ANION GAP 11 mmol/L    BUN 7 5 - 25 mg/dL    Creatinine 0.63 0.60 - 1.30 mg/dL    Glucose 119 65 - 140 mg/dL    Calcium 9.5 8.4 - 10.2 mg/dL    AST 15 13 - 39 U/L    ALT 17 7 - 52 U/L    Alkaline Phosphatase 55 34 - 104 U/L    Total Protein 7.4 6.4 - 8.4 g/dL    Albumin 4.5 3.5 - 5.0 g/dL    Total Bilirubin 0.49 0.20 - 1.00 mg/dL    eGFR 124 ml/min/1.73sq m   Lipase    Collection Time: 01/04/24  6:49 PM   Result Value Ref Range    Lipase 12 11 - 82 u/L       Imaging Studies: I have personally reviewed pertinent reports.      EKG, Pathology, and Other Studies: I have personally reviewed pertinent reports.        Tree Terry MD  OB/GYN PGY-3  1/5/2024  12:42 AM

## 2024-01-05 NOTE — TELEPHONE ENCOUNTER
I spoke with patient, she was very appreciative of our follow up. She did go to the ER which she is happy that she did. She scheduled with OB/GYN for next week and will be getting repeat hcg's done to make sure the shot that they gave her is working. She did cancel her appointment with you for now but will call if she needs anything at all. She said that she will follow up with you when everything is squared away with her.

## 2024-01-05 NOTE — DISCHARGE INSTRUCTIONS
Please complete your hormone level checks at the same lab site on Monday 1/8/24 and Thursday 1/11/24. You should have an appointment on Thursday afternoon as well for follow-up

## 2024-01-08 ENCOUNTER — APPOINTMENT (OUTPATIENT)
Dept: LAB | Facility: HOSPITAL | Age: 29
End: 2024-01-08
Payer: COMMERCIAL

## 2024-01-08 DIAGNOSIS — O00.90 ECTOPIC PREGNANCY: ICD-10-CM

## 2024-01-08 LAB — B-HCG SERPL-ACNC: 1027 MIU/ML (ref 0–5)

## 2024-01-08 PROCEDURE — 84702 CHORIONIC GONADOTROPIN TEST: CPT

## 2024-01-08 PROCEDURE — 36415 COLL VENOUS BLD VENIPUNCTURE: CPT

## 2024-01-08 NOTE — TELEPHONE ENCOUNTER
I have no on call Dr to use today so I am distributing problems.  This pt has never been here,  On 1/5 she was in ED ( us done - looks like poss ectopic. She was given methotrexate in ER . She has not had any heavy bleeding since then. On 1/4, hcg 859 and today 1027.  She was told by ER to have another hcg on Thurs and ov on Thurs. I need advice on what to do.  She has very light bleeding and off and on abdominal pain. WG office of preference.  Thank you

## 2024-01-08 NOTE — TELEPHONE ENCOUNTER
Pt called to schedule an appointment for a er-f/u and is stating she needs a Thursday. For a repeat dose. Would this appointment be with an MD only and can it be scheduled other than a Thursday before 1/11? Please advise.

## 2024-01-10 ENCOUNTER — TELEPHONE (OUTPATIENT)
Dept: OBGYN CLINIC | Facility: CLINIC | Age: 29
End: 2024-01-10

## 2024-01-10 NOTE — TELEPHONE ENCOUNTER
Pt is having some discharge - light cramping . I spoke with Dr. Adames. Pt will go for another hcg tomorrow ( 8 AM) and we will determine need for methatrexate after hcg back. Pt told to go toE R with severe pain - heavy bleeding. Pt has never yet been here as yet. Pt was seem in ER by Ciera Nicole on 1/4 and given methotrexate. This is f/.u tomorrow

## 2024-01-11 ENCOUNTER — OFFICE VISIT (OUTPATIENT)
Dept: OBGYN CLINIC | Facility: CLINIC | Age: 29
End: 2024-01-11

## 2024-01-11 ENCOUNTER — APPOINTMENT (OUTPATIENT)
Dept: LAB | Facility: HOSPITAL | Age: 29
End: 2024-01-11

## 2024-01-11 ENCOUNTER — TELEPHONE (OUTPATIENT)
Dept: OBGYN CLINIC | Facility: CLINIC | Age: 29
End: 2024-01-11

## 2024-01-11 VITALS — SYSTOLIC BLOOD PRESSURE: 148 MMHG | WEIGHT: 168.8 LBS | BODY MASS INDEX: 29.9 KG/M2 | DIASTOLIC BLOOD PRESSURE: 86 MMHG

## 2024-01-11 DIAGNOSIS — O00.90 ECTOPIC PREGNANCY, UNSPECIFIED LOCATION, UNSPECIFIED WHETHER INTRAUTERINE PREGNANCY PRESENT: ICD-10-CM

## 2024-01-11 DIAGNOSIS — O00.109 TUBAL PREGNANCY WITHOUT INTRAUTERINE PREGNANCY, UNSPECIFIED LATERALITY: Primary | ICD-10-CM

## 2024-01-11 LAB — B-HCG SERPL-ACNC: 596 MIU/ML (ref 0–5)

## 2024-01-11 PROCEDURE — 99203 OFFICE O/P NEW LOW 30 MIN: CPT | Performed by: STUDENT IN AN ORGANIZED HEALTH CARE EDUCATION/TRAINING PROGRAM

## 2024-01-11 PROCEDURE — 36415 COLL VENOUS BLD VENIPUNCTURE: CPT

## 2024-01-11 PROCEDURE — 84702 CHORIONIC GONADOTROPIN TEST: CPT

## 2024-01-11 NOTE — PROGRESS NOTES
Assessment/Plan:    Ectopic pregnancy without intrauterine pregnancy  -Hcg trend 859 () > 1027 ( Day 4) > 596 ( Day 7)  -Methotrexate given 24  ->15% decrease in Day 4 to Day 7 bcg, successful methotrexate treatment   -will repeat bhcg in 1 week to trend to 0. Discussed importance of surveillance. Pt verbalized understanding   -bleeding and pain precautions provided, discussed when to return to ED if needed   -follow up prn/annual        Diagnoses and all orders for this visit:    Tubal pregnancy without intrauterine pregnancy, unspecified laterality  -     hCG, quantitative; Future          Subjective:      Patient ID: Lety Barrera is a 29 y.o. female.    30yo  s/p methotrexate treatment on 24 for ectopic pregnancy presents for follow up visit. Pt reports mild cramping, but denies pelvic pain. Reports occasional spotting that is improving from prior vaginal bleeding.         The following portions of the patient's history were reviewed and updated as appropriate: allergies, current medications, past family history, past medical history, past social history, past surgical history, and problem list.    Review of Systems   Constitutional:  Negative for appetite change, chills, fatigue and fever.   Respiratory:  Negative for cough, chest tightness, shortness of breath and wheezing.    Cardiovascular:  Negative for chest pain, palpitations and leg swelling.   Gastrointestinal:  Negative for abdominal distention, abdominal pain, constipation, diarrhea, nausea and vomiting.   Endocrine: Negative for cold intolerance, heat intolerance and polyuria.   Genitourinary:  Negative for difficulty urinating, dyspareunia, dysuria, genital sores, menstrual problem, vaginal bleeding, vaginal discharge and vaginal pain.   Neurological:  Negative for dizziness, weakness, light-headedness and headaches.         Objective:      /86 (BP Location: Right arm, Patient Position: Sitting)   Wt 76.6 kg (168 lb  12.8 oz)   LMP  (LMP Unknown)   Breastfeeding Unknown   BMI 29.90 kg/m²          Physical Exam  Constitutional:       General: She is not in acute distress.     Appearance: Normal appearance. She is normal weight.   Cardiovascular:      Rate and Rhythm: Normal rate.   Pulmonary:      Effort: Pulmonary effort is normal. No respiratory distress.   Abdominal:      General: There is no distension.      Palpations: There is no mass.      Tenderness: There is no abdominal tenderness. There is no guarding or rebound.   Musculoskeletal:      Right lower leg: No edema.      Left lower leg: No edema.   Neurological:      Mental Status: She is alert and oriented to person, place, and time.   Psychiatric:         Mood and Affect: Mood normal.

## 2024-01-11 NOTE — ASSESSMENT & PLAN NOTE
-Hcg trend 859 (1/4) > 1027 (1/8 Day 4) > 596 (1/11 Day 7)  -Methotrexate given 1/5/24  ->15% decrease in Day 4 to Day 7 bcg, successful methotrexate treatment   -will repeat bhcg in 1 week to trend to 0. Discussed importance of surveillance. Pt verbalized understanding   -bleeding and pain precautions provided, discussed when to return to ED if needed   -follow up prn/annual

## 2024-01-18 ENCOUNTER — APPOINTMENT (OUTPATIENT)
Dept: LAB | Facility: HOSPITAL | Age: 29
End: 2024-01-18
Payer: COMMERCIAL

## 2024-01-18 DIAGNOSIS — O00.109 TUBAL PREGNANCY WITHOUT INTRAUTERINE PREGNANCY, UNSPECIFIED LATERALITY: ICD-10-CM

## 2024-01-18 DIAGNOSIS — O00.109 TUBAL PREGNANCY WITHOUT INTRAUTERINE PREGNANCY, UNSPECIFIED LATERALITY: Primary | ICD-10-CM

## 2024-01-18 LAB — B-HCG SERPL-ACNC: 212 MIU/ML (ref 0–5)

## 2024-01-18 PROCEDURE — 36415 COLL VENOUS BLD VENIPUNCTURE: CPT

## 2024-01-18 PROCEDURE — 84702 CHORIONIC GONADOTROPIN TEST: CPT

## 2024-01-23 NOTE — ED PROVIDER NOTES
History  Chief Complaint   Patient presents with    Pregnancy Problem     Patient reports having stylo IUD long term, reports taking two + pregnancy tests this AM. Patient reports for the last week they have been experiencing dizziness, heartburn, nausea, and several intermittent episodes vaginal bleeding. Patient reports they have not gotten their period for several years due to their IUD.      29-year-old female presenting to the emergency department for evaluation of positive pregnancy test.  Patient states she has had long-term IUD in place, however got a positive pregnancy test at home recently.  Patient is not sure how far along she is due to the fact that she does not regularly get a period because of the IUD.  She has some pelvic cramping, no vaginal bleeding.        Prior to Admission Medications   Prescriptions Last Dose Informant Patient Reported? Taking?   levonorgestrel (RANDOLPH) 13.5 MG intrauterine device  Self Yes No   Si each by Intrauterine route      Facility-Administered Medications: None       Past Medical History:   Diagnosis Date    Autonomic dysfunction 2021    Closed fracture of radius and ulna 2006    Concussion 2015    Late effect of intracranial injury (HCC) 2015    POTS (postural orthostatic tachycardia syndrome)     POTS (postural orthostatic tachycardia syndrome)     Sleep apnea        History reviewed. No pertinent surgical history.    History reviewed. No pertinent family history.  I have reviewed and agree with the history as documented.    E-Cigarette/Vaping    E-Cigarette Use Former User     Quit Date 3/1/20      E-Cigarette/Vaping Substances    Nicotine Yes     THC No     CBD No     Flavoring No     Other No     Unknown No      Social History     Tobacco Use    Smoking status: Former     Current packs/day: 0.00     Types: Cigarettes     Quit date: 3/1/2020     Years since quitting: 3.8    Smokeless tobacco: Never   Vaping Use    Vaping status: Former    Quit  date: 3/1/2020    Substances: Nicotine   Substance Use Topics    Alcohol use: Yes     Comment: socially    Drug use: Yes     Types: Marijuana     Comment: socially       Review of Systems   Constitutional:  Negative for appetite change, chills, fatigue and fever.   HENT:  Negative for sneezing and sore throat.    Eyes:  Negative for visual disturbance.   Respiratory:  Negative for cough, choking, chest tightness, shortness of breath and wheezing.    Cardiovascular:  Negative for chest pain and palpitations.   Gastrointestinal:  Negative for abdominal pain, constipation, diarrhea, nausea and vomiting.   Genitourinary:  Negative for difficulty urinating and dysuria.   Neurological:  Negative for dizziness, weakness, light-headedness, numbness and headaches.   All other systems reviewed and are negative.      Physical Exam  Physical Exam  Vitals and nursing note reviewed.   Constitutional:       General: She is not in acute distress.     Appearance: She is well-developed. She is not diaphoretic.   HENT:      Head: Normocephalic and atraumatic.   Eyes:      Pupils: Pupils are equal, round, and reactive to light.   Neck:      Vascular: No JVD.      Trachea: No tracheal deviation.   Cardiovascular:      Rate and Rhythm: Normal rate and regular rhythm.      Heart sounds: Normal heart sounds. No murmur heard.     No friction rub. No gallop.   Pulmonary:      Effort: Pulmonary effort is normal. No respiratory distress.      Breath sounds: Normal breath sounds. No wheezing or rales.   Abdominal:      General: Bowel sounds are normal. There is no distension.      Palpations: Abdomen is soft.      Tenderness: There is no abdominal tenderness. There is no guarding or rebound.   Skin:     General: Skin is warm and dry.      Coloration: Skin is not pale.   Neurological:      Mental Status: She is alert and oriented to person, place, and time.      Cranial Nerves: No cranial nerve deficit.      Motor: No abnormal muscle tone.    Psychiatric:         Behavior: Behavior normal.         Vital Signs  ED Triage Vitals [01/04/24 1841]   Temperature Pulse Respirations Blood Pressure SpO2   98.1 °F (36.7 °C) (!) 123 18 123/87 100 %      Temp Source Heart Rate Source Patient Position - Orthostatic VS BP Location FiO2 (%)   Temporal Monitor Sitting Left arm --      Pain Score       --           Vitals:    01/04/24 1841 01/04/24 2030 01/04/24 2100   BP: 123/87 113/64 116/63   Pulse: (!) 123 102 103   Patient Position - Orthostatic VS: Sitting           Visual Acuity  Visual Acuity      Flowsheet Row Most Recent Value   L Pupil Size (mm) 3   R Pupil Size (mm) 3            ED Medications  Medications   sodium chloride 0.9 % bolus 1,000 mL (0 mL Intravenous Stopped 1/4/24 2128)   ondansetron (ZOFRAN) injection 4 mg (4 mg Intravenous Given 1/4/24 1930)       Diagnostic Studies  Results Reviewed       Procedure Component Value Units Date/Time    hCG, quantitative [[id number]]  (Abnormal) Collected: 01/04/24 1849    Lab Status: Final result Specimen: Blood from Arm, Right Updated: 01/04/24 1919     HCG, Quant 859 mIU/mL     Narrative:       Expected Ranges:    HCG results between 5 and 25 mIU/mL may be indicative of early pregnancy but should be interpreted in light of the total clinical presentation.    HCG can rise to detectable levels in ugo and post menopausal women (0-11.6 mIU/mL).     Approximate               Approximate HCG  Gestation age          Concentration ( mIU/mL)  _____________          ______________________   Weeks                      HCG values  0.2-1                       5-50  1-2                           2-3                         100-5000  3-4                         500-64767  4-5                         1000-53660  5-6                         [id number]  6-8                         11842-362065  8-12                        [id number]      Comprehensive metabolic panel [[id number]]  (Abnormal) Collected: 01/04/24 1849     Lab Status: Final result Specimen: Blood from Arm, Right Updated: 01/04/24 1910     Sodium 136 mmol/L      Potassium 3.3 mmol/L      Chloride 105 mmol/L      CO2 20 mmol/L      ANION GAP 11 mmol/L      BUN 7 mg/dL      Creatinine 0.63 mg/dL      Glucose 119 mg/dL      Calcium 9.5 mg/dL      AST 15 U/L      ALT 17 U/L      Alkaline Phosphatase 55 U/L      Total Protein 7.4 g/dL      Albumin 4.5 g/dL      Total Bilirubin 0.49 mg/dL      eGFR 124 ml/min/1.73sq m     Narrative:      National Kid[employer] Foundation guidelines for Chronic Kidney Disease (CKD):     Stage 1 with normal or high GFR (GFR > 90 mL/min/1.73 square meters)    Stage 2 Mild CKD (GFR = 60-89 mL/min/1.73 square meters)    Stage 3A Moderate CKD (GFR = 45-59 mL/min/1.73 square meters)    Stage 3B Moderate CKD (GFR = 30-44 mL/min/1.73 square meters)    Stage 4 Severe CKD (GFR = 15-29 mL/min/1.73 square meters)    Stage 5 End Stage CKD (GFR <15 mL/min/1.73 square meters)  Note: GFR calculation is accurate only with a steady state creatinine    Lipase [[id number]]  (Normal) Collected: 01/04/24 1849    Lab Status: Final result Specimen: Blood from Arm, Right Updated: 01/04/24 1910     Lipase 12 u/L     CBC and differential [[id number]]  (Abnormal) Collected: 01/04/24 1849    Lab Status: Final result Specimen: Blood from Arm, Right Updated: 01/04/24 1854     WBC 9.21 Thousand/uL      RBC 4.52 Million/uL      Hemoglobin 14.3 g/dL      Hematocrit 42.2 %      MCV 93 fL      MCH 31.6 pg      MCHC 33.9 g/dL      RDW 12.3 %      MPV 10.6 fL      Platelets 250 Thousands/uL      nRBC 0 /100 WBCs      Neutrophils Relative 86 %      Immat GRANS % 0 %      Lymphocytes Relative 9 %      Monocytes Relative 5 %      Eosinophils Relative 0 %      Basophils Relative 0 %      Neutrophils Absolute 7.82 Thousands/µL      Immature Grans Absolute 0.02 Thousand/uL      Lymphocytes Absolute 0.86 Thousands/µL      Monocytes Absolute 0.47 Thousand/µL      Eosinophils Absolute  0.01 Thousand/µL      Basophils Absolute 0.03 Thousands/µL                    US OB < 14 weeks with transvaginal   Final Result by Gina Winston MD (01/04 2043)   No intrauterine gestation. Intrauterine device in standard position.   Right ovarian nodule suspicious for ectopic pregnancy.   No abnormal pelvic free fluid to suggest rupture.            Workstation performed: [id number]                    Procedures  Procedures         ED Course                               SBIRT 20yo+      Flowsheet Row Most Recent Value   Initial Alcohol Screen: US AUDIT-C     1. How often do you have a drink containing alcohol? 0 Filed at: 01/04/2024 2013   2. How many drinks containing alcohol do you have on a typical day you are drinking?  0 Filed at: 01/04/2024 2013   3b. FEMALE Any Age, or MALE 65+: How often do you have 4 or more drinks on one occassion? 0 Filed at: 01/04/2024 2013   Audit-C Score 0 Filed at: 01/04/2024 2013   DANA: How many times in the past year have you...    Used an illegal drug or used a prescription medication for non-medical reasons? Never Filed at: 01/04/2024 2013                      Medical Decision Making  49-year-old female with pregnancy, concern for possible ectopic, will check labs quant, ultrasound, reassess.        Reviewed case with Dr. Mckenna, does have concern for early ectopic IUP.  Dr. Mckenna recommends methotrexate however this is not available at this facility, will transfer to OB.    Amount and/or Complexity of Data Reviewed  Labs: ordered.  Radiology: ordered.    Risk  Prescription drug management.             Disposition  Final diagnoses:   Pregnancy     Time reflects when diagnosis was documented in both MDM as applicable and the Disposition within this note       Time User Action Codes Description Comment    1/4/2024  9:57 PM Maximo Tian Add [Z34.90] Pregnancy           ED Disposition       ED Disposition   Transfer to Another Facility-In Network    Condition   --    Date/Time   Thu Jan  4, 2024 2153    Comment   Leyt Barrera should be transferred out to [facility] under the care of Dr. Mckenna.               MD Documentation      Flowsheet Row Most Recent Value   Patient Condition The patient has been stabilized such that within reasonable medical probability, no material deterioration of the patient condition or the condition of the unborn child(fadia) is likely to result from the transfer   Reason for Transfer Level of Care needed not available at this facility   Benefits of Transfer Specialized equipment and/or services available at the receiving facility (Include comment)________________________  [OB Services]   Risks of Transfer Potential for delay in receiving treatment   Accepting Physician Dr. Mckenna   Accepting Facility Name, City & State  [facility]          RN Documentation      Flowsheet Row Most Recent Value   Accepting Facility Name, [facility]          Follow-up Information    None         Discharge Medication List as of 1/4/2024 10:14 PM        CONTINUE these medications which have NOT CHANGED    Details   levonorgestrel (RANDOLPH) 13.5 MG intrauterine device 1 each by Intrauterine route, Historical Med      loperamide (IMODIUM A-D) 2 MG tablet Take 1 tablet (2 mg total) by mouth 4 (four) times a day as needed for diarrhea, Starting Thu 6/8/2023, Normal             No discharge procedures on file.    PDMP Review       None            ED Provider  Electronically Signed by             Maximo Tian MD  01/23/24 0038

## 2024-01-24 ENCOUNTER — HOSPITAL ENCOUNTER (EMERGENCY)
Facility: HOSPITAL | Age: 29
Discharge: HOME | End: 2024-01-24
Attending: EMERGENCY MEDICINE

## 2024-01-24 ENCOUNTER — TELEPHONE (OUTPATIENT)
Dept: FAMILY MEDICINE CLINIC | Facility: CLINIC | Age: 29
End: 2024-01-24

## 2024-01-24 ENCOUNTER — APPOINTMENT (EMERGENCY)
Dept: ULTRASOUND IMAGING | Facility: HOSPITAL | Age: 29
End: 2024-01-24

## 2024-01-24 ENCOUNTER — TELEPHONE (OUTPATIENT)
Dept: OBGYN CLINIC | Facility: CLINIC | Age: 29
End: 2024-01-24

## 2024-01-24 VITALS
TEMPERATURE: 98.1 F | HEART RATE: 73 BPM | HEIGHT: 63 IN | WEIGHT: 168.87 LBS | DIASTOLIC BLOOD PRESSURE: 66 MMHG | RESPIRATION RATE: 18 BRPM | BODY MASS INDEX: 29.92 KG/M2 | OXYGEN SATURATION: 97 % | SYSTOLIC BLOOD PRESSURE: 123 MMHG

## 2024-01-24 DIAGNOSIS — R79.89 ELEVATED SERUM HCG: ICD-10-CM

## 2024-01-24 DIAGNOSIS — O00.201 RIGHT OVARIAN PREGNANCY WITHOUT INTRAUTERINE PREGNANCY: ICD-10-CM

## 2024-01-24 DIAGNOSIS — R10.9 ABDOMINAL PAIN, UNSPECIFIED ABDOMINAL LOCATION: Primary | ICD-10-CM

## 2024-01-24 LAB
ALBUMIN SERPL BCP-MCNC: 4.5 G/DL (ref 3.5–5)
ALP SERPL-CCNC: 56 U/L (ref 34–104)
ALT SERPL W P-5'-P-CCNC: 9 U/L (ref 7–52)
ANION GAP SERPL CALCULATED.3IONS-SCNC: 10 MMOL/L
AST SERPL W P-5'-P-CCNC: 12 U/L (ref 13–39)
B-HCG SERPL-ACNC: 16 MIU/ML (ref 0–5)
BASOPHILS # BLD AUTO: 0.02 THOUSANDS/ÂΜL (ref 0–0.1)
BASOPHILS NFR BLD AUTO: 0 % (ref 0–1)
BILIRUB SERPL-MCNC: 0.71 MG/DL (ref 0.2–1)
BUN SERPL-MCNC: 6 MG/DL (ref 5–25)
CALCIUM SERPL-MCNC: 9.9 MG/DL (ref 8.4–10.2)
CHLORIDE SERPL-SCNC: 106 MMOL/L (ref 96–108)
CO2 SERPL-SCNC: 23 MMOL/L (ref 21–32)
CREAT SERPL-MCNC: 0.65 MG/DL (ref 0.6–1.3)
EOSINOPHIL # BLD AUTO: 0.02 THOUSAND/ÂΜL (ref 0–0.61)
EOSINOPHIL NFR BLD AUTO: 0 % (ref 0–6)
ERYTHROCYTE [DISTWIDTH] IN BLOOD BY AUTOMATED COUNT: 12.7 % (ref 11.6–15.1)
GFR SERPL CREATININE-BSD FRML MDRD: 122 ML/MIN/1.73SQ M
GLUCOSE SERPL-MCNC: 107 MG/DL (ref 65–140)
HCT VFR BLD AUTO: 42 % (ref 34.8–46.1)
HGB BLD-MCNC: 14.3 G/DL (ref 11.5–15.4)
IMM GRANULOCYTES # BLD AUTO: 0.02 THOUSAND/UL (ref 0–0.2)
IMM GRANULOCYTES NFR BLD AUTO: 0 % (ref 0–2)
LIPASE SERPL-CCNC: 17 U/L (ref 11–82)
LYMPHOCYTES # BLD AUTO: 0.97 THOUSANDS/ÂΜL (ref 0.6–4.47)
LYMPHOCYTES NFR BLD AUTO: 16 % (ref 14–44)
MCH RBC QN AUTO: 31.9 PG (ref 26.8–34.3)
MCHC RBC AUTO-ENTMCNC: 34 G/DL (ref 31.4–37.4)
MCV RBC AUTO: 94 FL (ref 82–98)
MONOCYTES # BLD AUTO: 0.35 THOUSAND/ÂΜL (ref 0.17–1.22)
MONOCYTES NFR BLD AUTO: 6 % (ref 4–12)
NEUTROPHILS # BLD AUTO: 4.86 THOUSANDS/ÂΜL (ref 1.85–7.62)
NEUTS SEG NFR BLD AUTO: 78 % (ref 43–75)
NRBC BLD AUTO-RTO: 0 /100 WBCS
PLATELET # BLD AUTO: 258 THOUSANDS/UL (ref 149–390)
PMV BLD AUTO: 10.9 FL (ref 8.9–12.7)
POTASSIUM SERPL-SCNC: 3.5 MMOL/L (ref 3.5–5.3)
PROT SERPL-MCNC: 7.2 G/DL (ref 6.4–8.4)
RBC # BLD AUTO: 4.48 MILLION/UL (ref 3.81–5.12)
SODIUM SERPL-SCNC: 139 MMOL/L (ref 135–147)
WBC # BLD AUTO: 6.24 THOUSAND/UL (ref 4.31–10.16)

## 2024-01-24 PROCEDURE — 83690 ASSAY OF LIPASE: CPT | Performed by: EMERGENCY MEDICINE

## 2024-01-24 PROCEDURE — 36415 COLL VENOUS BLD VENIPUNCTURE: CPT | Performed by: EMERGENCY MEDICINE

## 2024-01-24 PROCEDURE — 96374 THER/PROPH/DIAG INJ IV PUSH: CPT

## 2024-01-24 PROCEDURE — 80053 COMPREHEN METABOLIC PANEL: CPT | Performed by: EMERGENCY MEDICINE

## 2024-01-24 PROCEDURE — 84702 CHORIONIC GONADOTROPIN TEST: CPT | Performed by: EMERGENCY MEDICINE

## 2024-01-24 PROCEDURE — 93005 ELECTROCARDIOGRAM TRACING: CPT

## 2024-01-24 PROCEDURE — 85025 COMPLETE CBC W/AUTO DIFF WBC: CPT | Performed by: EMERGENCY MEDICINE

## 2024-01-24 PROCEDURE — 76815 OB US LIMITED FETUS(S): CPT

## 2024-01-24 PROCEDURE — 99284 EMERGENCY DEPT VISIT MOD MDM: CPT | Performed by: EMERGENCY MEDICINE

## 2024-01-24 PROCEDURE — 99284 EMERGENCY DEPT VISIT MOD MDM: CPT

## 2024-01-24 RX ORDER — MORPHINE SULFATE 4 MG/ML
4 INJECTION, SOLUTION INTRAMUSCULAR; INTRAVENOUS ONCE
Status: COMPLETED | OUTPATIENT
Start: 2024-01-24 | End: 2024-01-24

## 2024-01-24 RX ADMIN — MORPHINE SULFATE 4 MG: 4 INJECTION INTRAVENOUS at 10:12

## 2024-01-24 NOTE — TELEPHONE ENCOUNTER
Patient went to ER regarding previous note - ectopic preg recovery - she was discharge with instructions to take tylenol for pain - patient asked if there is anything stronger that can be prescribed being that they had to give her morphine for the pain - tylenol is not working for her     They completed an ultrasound and completed Hcg test - she asked if she needs to complete the lab work that was order from us tomorrow? Or to disregard ?

## 2024-01-24 NOTE — TELEPHONE ENCOUNTER
Patient calling, 1/24/24, aprox. 8:26am.  She had an etopic prenancy 3 weeks ago and is recovering/healing from that.  She states that she started experiencing Chest pain, shaking arms and took tylenol and slept and thought ok.  But not, chest tightness, radiates through from hips to chest and pelvic pain.  Feels disoriented, short of breathe, tingling in fingertips/numbness, but states can feel fingertips still as she makes sure to keep tapping them against the table to be sure.  Placed patient on hold and reached out via teams to RN who agreed to send to ER.  I advised patient to go to ER and she is on the way.  She states she will head to the [facility].  The patient stated that they would be driving themself to the ER, I asked if she was sure she would be okay and she stressed she would be fine and had no other alternative.  I advised the patient that if she felt worse in any way, regardless of those factors, to please pull over and call 911.  I also asked if the patient could please call or have ER call us to let us know she made it okay.

## 2024-01-24 NOTE — TELEPHONE ENCOUNTER
Would recommend she follow up with OB to see what their suggestions are on best options for pain control

## 2024-01-24 NOTE — ED PROVIDER NOTES
History  Chief Complaint   Patient presents with    Abdominal Pain     Ectopic pregnancy 2 wks ago and took meds for it and was feeling better but last night started had increased abd pain and dizziness      This is 29-year-old female with past medical history of ectopic pregnancy status post methotrexate and following up with outpatient OB/GYN presented with 1 day history of epigastric, bilateral (more on the left side) lower quadrant abdominal pain radiating to chest, nausea, bilious, nonbloody vomiting, squeezing/pressure rising chest pain, shortness of breath, lightheadedness.    She lifted a heavy object yesterday and all the symptoms started after that.  She was instructed not to lift heavy weight by OB/GYN.    Patient declined any cough, travel history, contact with sick people, palpitations, skipping beats, diarrhea, urinary problems.        Prior to Admission Medications   Prescriptions Last Dose Informant Patient Reported? Taking?   hydrOXYzine HCL (ATARAX) 25 mg tablet   No No   Sig: Take 1 tablet (25 mg total) by mouth every 6 (six) hours as needed for itching   levonorgestrel (RANDOLPH) 13.5 MG intrauterine device  Self Yes No   Si each by Intrauterine route   ondansetron (ZOFRAN) 4 mg tablet   No No   Sig: Take 1 tablet (4 mg total) by mouth every 8 (eight) hours as needed for nausea or vomiting      Facility-Administered Medications: None       Past Medical History:   Diagnosis Date    Autonomic dysfunction 2021    Closed fracture of radius and ulna 2006    Concussion 2015    Late effect of intracranial injury (HCC) 2015    POTS (postural orthostatic tachycardia syndrome)     POTS (postural orthostatic tachycardia syndrome)     Sleep apnea        History reviewed. No pertinent surgical history.    History reviewed. No pertinent family history.  I have reviewed and agree with the history as documented.    E-Cigarette/Vaping    E-Cigarette Use Former User     Quit Date 3/1/20       E-Cigarette/Vaping Substances    Nicotine Yes     THC No     CBD No     Flavoring No     Other No     Unknown No      Social History     Tobacco Use    Smoking status: Former     Current packs/day: 0.00     Types: Cigarettes     Quit date: 3/1/2020     Years since quitting: 3.9    Smokeless tobacco: Never   Vaping Use    Vaping status: Former    Quit date: 3/1/2020    Substances: Nicotine   Substance Use Topics    Alcohol use: Yes     Comment: socially    Drug use: Yes     Types: Marijuana     Comment: socially        Review of Systems   Constitutional:  Positive for activity change. Negative for appetite change, chills, diaphoresis and fatigue.   HENT:  Negative for congestion, dental problem, drooling, ear discharge and ear pain.    Respiratory:  Positive for chest tightness and shortness of breath. Negative for apnea, cough, choking, wheezing and stridor.    Cardiovascular:  Positive for chest pain. Negative for palpitations and leg swelling.   Gastrointestinal:  Positive for abdominal pain, nausea and vomiting. Negative for abdominal distention, anal bleeding, blood in stool, constipation, diarrhea and rectal pain.   Genitourinary:  Positive for vaginal discharge. Negative for difficulty urinating, dyspareunia, dysuria, enuresis, flank pain, frequency, genital sores, hematuria and menstrual problem.   Musculoskeletal:  Negative for arthralgias, back pain, gait problem and joint swelling.       Physical Exam  ED Triage Vitals   Temperature Pulse Respirations Blood Pressure SpO2   01/24/24 1040 01/24/24 0919 01/24/24 0919 01/24/24 0919 01/24/24 0919   98.1 °F (36.7 °C) 102 18 144/96 99 %      Temp Source Heart Rate Source Patient Position - Orthostatic VS BP Location FiO2 (%)   01/24/24 1040 01/24/24 0930 01/24/24 0930 01/24/24 0919 --   Oral Monitor Sitting Right arm       Pain Score       01/24/24 1012       6             Orthostatic Vital Signs  Vitals:    01/24/24 0919 01/24/24 0930 01/24/24 1010 01/24/24  1030   BP: 144/96 129/79 132/68 123/66   Pulse: 102 (!) 106 83 73   Patient Position - Orthostatic VS:  Sitting  Sitting       Physical Exam  Constitutional:       General: She is in acute distress.      Appearance: She is not ill-appearing, toxic-appearing or diaphoretic.   Cardiovascular:      Rate and Rhythm: Regular rhythm. Tachycardia present.      Heart sounds: No murmur heard.     No friction rub. No gallop.   Pulmonary:      Effort: Pulmonary effort is normal. No respiratory distress.      Breath sounds: Normal breath sounds. No stridor. No wheezing, rhonchi or rales.   Chest:      Chest wall: No tenderness.   Abdominal:      General: Bowel sounds are normal. There is distension.      Palpations: There is no shifting dullness, fluid wave, hepatomegaly, splenomegaly, mass or pulsatile mass.      Tenderness: There is abdominal tenderness in the right lower quadrant, epigastric area and left lower quadrant. There is no right CVA tenderness, left CVA tenderness, guarding or rebound.   Neurological:      Mental Status: She is alert and oriented to person, place, and time.   Psychiatric:         Mood and Affect: Mood normal.         Behavior: Behavior normal.         ED Medications  Medications   morphine injection 4 mg (4 mg Intravenous Given 1/24/24 1012)       Diagnostic Studies  Results Reviewed       Procedure Component Value Units Date/Time    hCG, quantitative [[id number]]  (Abnormal) Collected: 01/24/24 1012    Lab Status: Final result Specimen: Blood from Arm, Right Updated: 01/24/24 1052     HCG, Quant 16 mIU/mL     Narrative:       Expected Ranges:    HCG results between 5 and 25 mIU/mL may be indicative of early pregnancy but should be interpreted in light of the total clinical presentation.    HCG can rise to detectable levels in ugo and post menopausal women (0-11.6 mIU/mL).     Approximate               Approximate HCG  Gestation age          Concentration ( mIU/mL)  _____________           ______________________   Weeks                      HCG values  0.2-1                       5-50  1-2                           2-3                         100-5000  3-4                         500-95476  4-5                         1000-93155  5-6                         [id number]  6-8                         [id number]  8-12                        [id number]      Comprehensive metabolic panel [[id number]]  (Abnormal) Collected: 01/24/24 1012    Lab Status: Final result Specimen: Blood from Arm, Right Updated: 01/24/24 1044     Sodium 139 mmol/L      Potassium 3.5 mmol/L      Chloride 106 mmol/L      CO2 23 mmol/L      ANION GAP 10 mmol/L      BUN 6 mg/dL      Creatinine 0.65 mg/dL      Glucose 107 mg/dL      Calcium 9.9 mg/dL      AST 12 U/L      ALT 9 U/L      Alkaline Phosphatase 56 U/L      Total Protein 7.2 g/dL      Albumin 4.5 g/dL      Total Bilirubin 0.71 mg/dL      eGFR 122 ml/min/1.73sq m     Narrative:      [employer] guidelines for Chronic Kidney Disease (CKD):     Stage 1 with normal or high GFR (GFR > 90 mL/min/1.73 square meters)    Stage 2 Mild CKD (GFR = 60-89 mL/min/1.73 square meters)    Stage 3A Moderate CKD (GFR = 45-59 mL/min/1.73 square meters)    Stage 3B Moderate CKD (GFR = 30-44 mL/min/1.73 square meters)    Stage 4 Severe CKD (GFR = 15-29 mL/min/1.73 square meters)    Stage 5 End Stage CKD (GFR <15 mL/min/1.73 square meters)  Note: GFR calculation is accurate only with a steady state creatinine    Lipase [[id number]]  (Normal) Collected: 01/24/24 1012    Lab Status: Final result Specimen: Blood from Arm, Right Updated: 01/24/24 1044     Lipase 17 u/L     CBC and differential [[id number]]  (Abnormal) Collected: 01/24/24 1012    Lab Status: Final result Specimen: Blood from Arm, Right Updated: 01/24/24 1029     WBC 6.24 Thousand/uL      RBC 4.48 Million/uL      Hemoglobin 14.3 g/dL      Hematocrit 42.0 %      MCV 94 fL      MCH 31.9 pg      MCHC 34.0 g/dL       RDW 12.7 %      MPV 10.9 fL      Platelets 258 Thousands/uL      nRBC 0 /100 WBCs      Neutrophils Relative 78 %      Immat GRANS % 0 %      Lymphocytes Relative 16 %      Monocytes Relative 6 %      Eosinophils Relative 0 %      Basophils Relative 0 %      Neutrophils Absolute 4.86 Thousands/µL      Immature Grans Absolute 0.02 Thousand/uL      Lymphocytes Absolute 0.97 Thousands/µL      Monocytes Absolute 0.35 Thousand/µL      Eosinophils Absolute 0.02 Thousand/µL      Basophils Absolute 0.02 Thousands/µL                    US OB pregnancy limited with transvaginal   Final Result by Avel Canela MD (01/24 1201)      No intrauterine gestation. IUD in normal expected location.      Prior right paraovarian nodule previously suspected to be ectopic pregnancy and intraovarian corpus luteum have resolved. Correlate with serial quantitative BHCG.                  Workstation performed: [id number]               Procedures  ECG 12 Lead Documentation Only    Date/Time: 1/24/2024 10:02 AM    Performed by: Lorenzo Hogan MD  Authorized by: Lorenzo Hogan MD    ECG reviewed by me, the ED Provider: yes    Patient location:  ED  Previous ECG:     Previous ECG:  Compared to current    Comparison ECG info:  Today's EKG showed normal sinus rhythm with normal rate compared to sinus tachycardia in the previous EKG.    Similarity:  Changes noted    Comparison to cardiac monitor: No    Interpretation:     Interpretation: normal    Rate:     ECG rate assessment: normal    Rhythm:     Rhythm: sinus rhythm    Ectopy:     Ectopy: none    QRS:     QRS axis:  Normal  ST segments:     ST segments:  Normal        ED Course       This is 29-year-old female with past medical history of ectopic pregnancy status post methotrexate and following up with outpatient OB/GYN came to ED with history of epigastric, bilateral lower quadrant (more on the left side) abdomen pain, nausea, vomiting, shortness of breath, squeezing  chest pain, lightheadedness for the past 1 day after lifting the heavy objet (laundry box).    During this admission, CBC, CMP, lipase, EKG, US is transvaginal are unremarkable.  hCG was decreasing trend.  Patient was stable to be discharged.                        SBIRT 22yo+      Flowsheet Row Most Recent Value   Initial Alcohol Screen: US AUDIT-C     1. How often do you have a drink containing alcohol? 0 Filed at: 01/24/2024 0918   2. How many drinks containing alcohol do you have on a typical day you are drinking?  0 Filed at: 01/24/2024 0918   3b. FEMALE Any Age, or MALE 65+: How often do you have 4 or more drinks on one occassion? 0 Filed at: 01/24/2024 0918   Audit-C Score 0 Filed at: 01/24/2024 0918   DANA: How many times in the past year have you...    Used an illegal drug or used a prescription medication for non-medical reasons? Never Filed at: 01/24/2024 0918                  Medical Decision Making  This is 29-year-old female with past medical history of ectopic pregnancy status post methotrexate and following up with outpatient OB/GYN came to ED with history of epigastric, bilateral lower quadrant (more on the left side) abdomen pain, nausea, vomiting, shortness of breath, squeezing chest pain, lightheadedness for the past 1 day after lifting the heavy objet (laundry box).    During this admission, CBC, CMP, lipase, EKG, US is transvaginal are unremarkable.  hCG was decreasing trend.  Patient was stable to be discharged.      Amount and/or Complexity of Data Reviewed  Labs: ordered.  Radiology: ordered.    Risk  Prescription drug management.          Disposition  Final diagnoses:   Abdominal pain, unspecified abdominal location   Elevated serum hCG     Time reflects when diagnosis was documented in both MDM as applicable and the Disposition within this note       Time User Action Codes Description Comment    1/24/2024 12:15 PM Lorenzo Hogan Add [R10.9] Abdominal pain, unspecified abdominal  location     1/24/2024 12:16 PM Lorenzo Hogan [O00.201] Right ovarian pregnancy without intrauterine pregnancy     1/24/2024 12:21 PM Kane Jimenez Add [R79.89] Elevated serum hCG           ED Disposition       ED Disposition   Discharge    Condition   Stable    Date/Time   Wed Jan 24, 2024 1215    Comment   Lety Barrera discharge to home/self care.                   Follow-up Information    None         Patient's Medications   Discharge Prescriptions    No medications on file         PDMP Review       None             ED Provider  Attending physically available and evaluated Lety Barrera. I managed the patient along with the ED Attending.    Electronically Signed by           Lorenzo Hogan MD  01/24/24 1228       Lorenzo Hogan MD  01/24/24 122

## 2024-01-24 NOTE — TELEPHONE ENCOUNTER
Spoke with patient over the phone. Reassurance provided. Discussed resolution of ectopic pregnancy and repeat hcg in one week to trend to 0. Pt verbalized understanding.

## 2024-01-24 NOTE — TELEPHONE ENCOUNTER
Please review u/s - looks ok to me.  Another hcg in 1 week.? She has cramping and I told her advil and tylenol - alternate.  Any other f/u?  Thanks

## 2024-01-24 NOTE — TELEPHONE ENCOUNTER
Patient just came from the ED - has an ectopic pregnancy with abdominal pain.  They gave her morphine in the hospital but nothing for after she left. Extra strength Tylenol and ibuprofen does not help.  She also put a call into her OB.  She needs advise as what to do for the pain.

## 2024-01-27 LAB
ATRIAL RATE: 74 BPM
P AXIS: 58 DEGREES
PR INTERVAL: 146 MS
QRS AXIS: 64 DEGREES
QRSD INTERVAL: 82 MS
QT INTERVAL: 382 MS
QTC INTERVAL: 424 MS
T WAVE AXIS: 42 DEGREES
VENTRICULAR RATE: 74 BPM

## 2024-02-01 ENCOUNTER — APPOINTMENT (OUTPATIENT)
Dept: LAB | Facility: HOSPITAL | Age: 29
End: 2024-02-01
Payer: COMMERCIAL

## 2024-02-01 DIAGNOSIS — O00.109 TUBAL PREGNANCY WITHOUT INTRAUTERINE PREGNANCY, UNSPECIFIED LATERALITY: ICD-10-CM

## 2024-02-01 LAB — B-HCG SERPL-ACNC: 2 MIU/ML (ref 0–5)

## 2024-02-01 PROCEDURE — 36415 COLL VENOUS BLD VENIPUNCTURE: CPT

## 2024-02-01 PROCEDURE — 84702 CHORIONIC GONADOTROPIN TEST: CPT
